# Patient Record
Sex: MALE | Race: WHITE | NOT HISPANIC OR LATINO | Employment: OTHER | URBAN - METROPOLITAN AREA
[De-identification: names, ages, dates, MRNs, and addresses within clinical notes are randomized per-mention and may not be internally consistent; named-entity substitution may affect disease eponyms.]

---

## 2017-03-08 PROCEDURE — 36415 COLL VENOUS BLD VENIPUNCTURE: CPT | Performed by: INTERNAL MEDICINE

## 2017-03-08 PROCEDURE — 82570 ASSAY OF URINE CREATININE: CPT | Performed by: INTERNAL MEDICINE

## 2017-03-08 PROCEDURE — 80061 LIPID PANEL: CPT | Performed by: INTERNAL MEDICINE

## 2017-03-08 PROCEDURE — 80050 GENERAL HEALTH PANEL: CPT | Performed by: INTERNAL MEDICINE

## 2017-03-08 PROCEDURE — 81001 URINALYSIS AUTO W/SCOPE: CPT | Performed by: INTERNAL MEDICINE

## 2017-03-08 PROCEDURE — 82043 UR ALBUMIN QUANTITATIVE: CPT | Performed by: INTERNAL MEDICINE

## 2018-08-15 PROBLEM — E66.01 MORBID OBESITY WITH BMI OF 40.0-44.9, ADULT (HCC): Status: ACTIVE | Noted: 2018-08-15

## 2019-06-04 PROBLEM — R42 VERTIGO: Status: ACTIVE | Noted: 2019-06-04

## 2020-01-10 PROBLEM — R97.20 ELEVATED PSA: Status: ACTIVE | Noted: 2020-01-10

## 2020-04-20 PROCEDURE — 88305 TISSUE EXAM BY PATHOLOGIST: CPT | Performed by: UROLOGY

## 2020-09-10 PROBLEM — C61 MALIGNANT NEOPLASM OF PROSTATE (HCC): Status: ACTIVE | Noted: 2020-09-10

## 2020-10-16 ENCOUNTER — APPOINTMENT (OUTPATIENT)
Dept: CT IMAGING | Age: 65
End: 2020-10-16
Attending: EMERGENCY MEDICINE

## 2020-10-16 ENCOUNTER — HOSPITAL ENCOUNTER (OUTPATIENT)
Age: 65
Setting detail: OBSERVATION
Discharge: HOME | End: 2020-10-18
Attending: EMERGENCY MEDICINE | Admitting: HOSPITALIST

## 2020-10-16 DIAGNOSIS — R33.8 ACUTE URINARY RETENTION: Primary | ICD-10-CM

## 2020-10-16 DIAGNOSIS — N39.0 URINARY TRACT INFECTION WITHOUT HEMATURIA, SITE UNSPECIFIED: ICD-10-CM

## 2020-10-16 LAB
ANION GAP SERPL CALC-SCNC: 10 MMOL/L (ref 10–20)
APPEARANCE UR: ABNORMAL
BACTERIA #/AREA URNS HPF: ABNORMAL /HPF
BASOPHILS # BLD: 0.1 K/MCL (ref 0–0.3)
BASOPHILS NFR BLD: 1 %
BILIRUB UR QL STRIP: NEGATIVE
BUN SERPL-MCNC: 15 MG/DL (ref 6–20)
BUN/CREAT SERPL: 29 (ref 7–25)
CALCIUM SERPL-MCNC: 8.7 MG/DL (ref 8.4–10.2)
CHLORIDE SERPL-SCNC: 102 MMOL/L (ref 98–107)
CO2 SERPL-SCNC: 27 MMOL/L (ref 21–32)
COLOR UR: YELLOW
CREAT SERPL-MCNC: 0.53 MG/DL (ref 0.67–1.17)
DIFFERENTIAL METHOD BLD: ABNORMAL
EOSINOPHIL # BLD: 0.3 K/MCL (ref 0.1–0.5)
EOSINOPHIL NFR BLD: 4 %
ERYTHROCYTE [DISTWIDTH] IN BLOOD: 12.6 % (ref 11–15)
GLUCOSE BLDC GLUCOMTR-MCNC: 159 MG/DL (ref 70–99)
GLUCOSE BLDC GLUCOMTR-MCNC: 160 MG/DL (ref 70–99)
GLUCOSE SERPL-MCNC: 175 MG/DL (ref 65–99)
GLUCOSE UR STRIP-MCNC: NEGATIVE MG/DL
HCT VFR BLD CALC: 36.3 % (ref 39–51)
HGB BLD-MCNC: 11.8 G/DL (ref 13–17)
HGB UR QL STRIP: ABNORMAL
HYALINE CASTS #/AREA URNS LPF: ABNORMAL /LPF (ref 0–5)
IMM GRANULOCYTES # BLD AUTO: 0 K/MCL (ref 0–0.2)
IMM GRANULOCYTES NFR BLD: 0 %
KETONES UR STRIP-MCNC: NEGATIVE MG/DL
LEUKOCYTE ESTERASE UR QL STRIP: ABNORMAL
LYMPHOCYTES # BLD: 0.5 K/MCL (ref 1–4)
LYMPHOCYTES NFR BLD: 5 %
MCH RBC QN AUTO: 30.4 PG (ref 26–34)
MCHC RBC AUTO-ENTMCNC: 32.5 G/DL (ref 32–36.5)
MCV RBC AUTO: 93.6 FL (ref 78–100)
MONOCYTES # BLD: 0.6 K/MCL (ref 0.3–0.9)
MONOCYTES NFR BLD: 7 %
NEUTROPHILS # BLD: 7 K/MCL (ref 1.8–7.7)
NEUTROPHILS NFR BLD: 83 %
NITRITE UR QL STRIP: NEGATIVE
NRBC BLD MANUAL-RTO: 0 /100 WBC
PH UR STRIP: 6 UNITS (ref 5–7)
PLATELET # BLD: 330 K/MCL (ref 140–450)
POTASSIUM SERPL-SCNC: 3.9 MMOL/L (ref 3.4–5.1)
PROT UR STRIP-MCNC: 30 MG/DL
RBC # BLD: 3.88 MIL/MCL (ref 4.5–5.9)
RBC #/AREA URNS HPF: ABNORMAL /HPF (ref 0–2)
SODIUM SERPL-SCNC: 135 MMOL/L (ref 135–145)
SP GR UR STRIP: 1.01 (ref 1–1.03)
SPECIMEN SOURCE: ABNORMAL
SQUAMOUS #/AREA URNS HPF: ABNORMAL /HPF (ref 0–5)
UROBILINOGEN UR STRIP-MCNC: 0.2 MG/DL (ref 0–1)
WBC # BLD: 8.4 K/MCL (ref 4.2–11)
WBC #/AREA URNS HPF: >100 /HPF (ref 0–5)

## 2020-10-16 PROCEDURE — 96375 TX/PRO/DX INJ NEW DRUG ADDON: CPT

## 2020-10-16 PROCEDURE — 10002800 HB RX 250 W HCPCS: Performed by: EMERGENCY MEDICINE

## 2020-10-16 PROCEDURE — 10002807 HB RX 258: Performed by: EMERGENCY MEDICINE

## 2020-10-16 PROCEDURE — 51798 US URINE CAPACITY MEASURE: CPT

## 2020-10-16 PROCEDURE — 87086 URINE CULTURE/COLONY COUNT: CPT

## 2020-10-16 PROCEDURE — 81001 URINALYSIS AUTO W/SCOPE: CPT

## 2020-10-16 PROCEDURE — 96374 THER/PROPH/DIAG INJ IV PUSH: CPT

## 2020-10-16 PROCEDURE — 10002800 HB RX 250 W HCPCS: Performed by: HOSPITALIST

## 2020-10-16 PROCEDURE — 99284 EMERGENCY DEPT VISIT MOD MDM: CPT

## 2020-10-16 PROCEDURE — 96376 TX/PRO/DX INJ SAME DRUG ADON: CPT

## 2020-10-16 PROCEDURE — G0378 HOSPITAL OBSERVATION PER HR: HCPCS

## 2020-10-16 PROCEDURE — 10004651 HB RX, NO CHARGE ITEM: Performed by: HOSPITALIST

## 2020-10-16 PROCEDURE — 80048 BASIC METABOLIC PNL TOTAL CA: CPT

## 2020-10-16 PROCEDURE — 10002807 HB RX 258: Performed by: HOSPITALIST

## 2020-10-16 PROCEDURE — 10002803 HB RX 637: Performed by: HOSPITALIST

## 2020-10-16 PROCEDURE — 74177 CT ABD & PELVIS W/CONTRAST: CPT

## 2020-10-16 PROCEDURE — 85025 COMPLETE CBC W/AUTO DIFF WBC: CPT

## 2020-10-16 PROCEDURE — 82962 GLUCOSE BLOOD TEST: CPT

## 2020-10-16 PROCEDURE — 87040 BLOOD CULTURE FOR BACTERIA: CPT

## 2020-10-16 PROCEDURE — 10002805 HB CONTRAST AGENT: Performed by: EMERGENCY MEDICINE

## 2020-10-16 RX ORDER — ATORVASTATIN CALCIUM 40 MG/1
40 TABLET, FILM COATED ORAL NIGHTLY
Status: DISCONTINUED | OUTPATIENT
Start: 2020-10-16 | End: 2020-10-18 | Stop reason: HOSPADM

## 2020-10-16 RX ORDER — TRIAMTERENE AND HYDROCHLOROTHIAZIDE 37.5; 25 MG/1; MG/1
1 TABLET ORAL DAILY
COMMUNITY
End: 2021-04-26

## 2020-10-16 RX ORDER — SIMVASTATIN 80 MG
80 TABLET ORAL DAILY
COMMUNITY

## 2020-10-16 RX ORDER — NICOTINE POLACRILEX 4 MG
30 LOZENGE BUCCAL PRN
Status: DISCONTINUED | OUTPATIENT
Start: 2020-10-16 | End: 2020-10-18 | Stop reason: HOSPADM

## 2020-10-16 RX ORDER — ENALAPRIL MALEATE 2.5 MG/1
2.5 TABLET ORAL DAILY
Status: DISCONTINUED | OUTPATIENT
Start: 2020-10-17 | End: 2020-10-18 | Stop reason: HOSPADM

## 2020-10-16 RX ORDER — TRIAMTERENE AND HYDROCHLOROTHIAZIDE 37.5; 25 MG/1; MG/1
1 TABLET ORAL DAILY
Status: DISCONTINUED | OUTPATIENT
Start: 2020-10-17 | End: 2020-10-18 | Stop reason: HOSPADM

## 2020-10-16 RX ORDER — HYDROCHLOROTHIAZIDE 12.5 MG/1
12.5 CAPSULE, GELATIN COATED ORAL DAILY
Status: ON HOLD | COMMUNITY
End: 2020-10-16 | Stop reason: ALTCHOICE

## 2020-10-16 RX ORDER — TAMSULOSIN HYDROCHLORIDE 0.4 MG/1
0.4 CAPSULE ORAL
Status: ON HOLD | COMMUNITY
End: 2020-10-18 | Stop reason: HOSPADM

## 2020-10-16 RX ORDER — NICOTINE POLACRILEX 4 MG
15 LOZENGE BUCCAL PRN
Status: DISCONTINUED | OUTPATIENT
Start: 2020-10-16 | End: 2020-10-18 | Stop reason: HOSPADM

## 2020-10-16 RX ORDER — 0.9 % SODIUM CHLORIDE 0.9 %
2 VIAL (ML) INJECTION EVERY 12 HOURS SCHEDULED
Status: DISCONTINUED | OUTPATIENT
Start: 2020-10-16 | End: 2020-10-18 | Stop reason: HOSPADM

## 2020-10-16 RX ORDER — VERAPAMIL HYDROCHLORIDE 240 MG/1
240 CAPSULE, EXTENDED RELEASE ORAL DAILY
COMMUNITY
End: 2021-04-26

## 2020-10-16 RX ORDER — ENOXAPARIN SODIUM 100 MG/ML
40 INJECTION SUBCUTANEOUS DAILY
Status: DISCONTINUED | OUTPATIENT
Start: 2020-10-16 | End: 2020-10-16

## 2020-10-16 RX ORDER — TAMSULOSIN HYDROCHLORIDE 0.4 MG/1
0.4 CAPSULE ORAL
Status: DISCONTINUED | OUTPATIENT
Start: 2020-10-17 | End: 2020-10-18 | Stop reason: HOSPADM

## 2020-10-16 RX ORDER — CIPROFLOXACIN 250 MG/1
250 TABLET, FILM COATED ORAL 2 TIMES DAILY
COMMUNITY
Start: 2020-10-12 | End: 2020-10-18

## 2020-10-16 RX ORDER — DEXTROSE MONOHYDRATE 25 G/50ML
25 INJECTION, SOLUTION INTRAVENOUS PRN
Status: DISCONTINUED | OUTPATIENT
Start: 2020-10-16 | End: 2020-10-18 | Stop reason: HOSPADM

## 2020-10-16 RX ORDER — LEVOTHYROXINE SODIUM 0.2 MG/1
200 TABLET ORAL DAILY
COMMUNITY

## 2020-10-16 RX ORDER — DIPHENHYDRAMINE HYDROCHLORIDE 50 MG/ML
25 INJECTION INTRAMUSCULAR; INTRAVENOUS ONCE
Status: COMPLETED | OUTPATIENT
Start: 2020-10-16 | End: 2020-10-16

## 2020-10-16 RX ORDER — DEXTROSE MONOHYDRATE 25 G/50ML
12.5 INJECTION, SOLUTION INTRAVENOUS PRN
Status: DISCONTINUED | OUTPATIENT
Start: 2020-10-16 | End: 2020-10-18 | Stop reason: HOSPADM

## 2020-10-16 RX ORDER — VERAPAMIL HYDROCHLORIDE 240 MG/1
240 TABLET, FILM COATED, EXTENDED RELEASE ORAL DAILY
Status: DISCONTINUED | OUTPATIENT
Start: 2020-10-17 | End: 2020-10-18 | Stop reason: HOSPADM

## 2020-10-16 RX ORDER — LEVOTHYROXINE SODIUM 0.1 MG/1
200 TABLET ORAL
Status: DISCONTINUED | OUTPATIENT
Start: 2020-10-17 | End: 2020-10-18 | Stop reason: HOSPADM

## 2020-10-16 RX ORDER — DEXTROSE MONOHYDRATE 50 MG/ML
INJECTION, SOLUTION INTRAVENOUS CONTINUOUS PRN
Status: DISCONTINUED | OUTPATIENT
Start: 2020-10-16 | End: 2020-10-18 | Stop reason: HOSPADM

## 2020-10-16 RX ORDER — FLUTICASONE PROPIONATE AND SALMETEROL 250; 50 UG/1; UG/1
1 POWDER RESPIRATORY (INHALATION)
COMMUNITY

## 2020-10-16 RX ORDER — ENALAPRIL MALEATE 2.5 MG/1
2.5 TABLET ORAL DAILY
COMMUNITY

## 2020-10-16 RX ORDER — ENOXAPARIN SODIUM 100 MG/ML
40 INJECTION SUBCUTANEOUS EVERY 12 HOURS
Status: DISCONTINUED | OUTPATIENT
Start: 2020-10-17 | End: 2020-10-18 | Stop reason: HOSPADM

## 2020-10-16 RX ORDER — FLUTICASONE FUROATE AND VILANTEROL 100; 25 UG/1; UG/1
1 POWDER RESPIRATORY (INHALATION)
Status: DISCONTINUED | OUTPATIENT
Start: 2020-10-17 | End: 2020-10-18 | Stop reason: HOSPADM

## 2020-10-16 RX ORDER — LIDOCAINE HYDROCHLORIDE 20 MG/ML
10 JELLY TOPICAL
Status: DISCONTINUED | OUTPATIENT
Start: 2020-10-16 | End: 2020-10-18 | Stop reason: HOSPADM

## 2020-10-16 RX ADMIN — DIPHENHYDRAMINE HYDROCHLORIDE 25 MG: 50 INJECTION, SOLUTION INTRAMUSCULAR; INTRAVENOUS at 12:51

## 2020-10-16 RX ADMIN — SODIUM CHLORIDE, PRESERVATIVE FREE 2 ML: 5 INJECTION INTRAVENOUS at 20:23

## 2020-10-16 RX ADMIN — IOHEXOL 100 ML: 350 INJECTION, SOLUTION INTRAVENOUS at 14:15

## 2020-10-16 RX ADMIN — HYDROCORTISONE SODIUM SUCCINATE 50 MG: 100 INJECTION, POWDER, FOR SOLUTION INTRAMUSCULAR; INTRAVENOUS at 12:51

## 2020-10-16 RX ADMIN — SODIUM CHLORIDE 25 ML: 0.9 INJECTION, SOLUTION INTRAVENOUS at 22:41

## 2020-10-16 RX ADMIN — DESMOPRESSIN ACETATE 40 MG: 0.2 TABLET ORAL at 22:38

## 2020-10-16 RX ADMIN — CEFEPIME HYDROCHLORIDE 1000 MG: 1 INJECTION, POWDER, FOR SOLUTION INTRAMUSCULAR; INTRAVENOUS at 22:44

## 2020-10-16 RX ADMIN — CEFEPIME 2000 MG: 2 INJECTION, POWDER, FOR SOLUTION INTRAVENOUS at 15:45

## 2020-10-16 RX ADMIN — INSULIN LISPRO 1 UNITS: 100 INJECTION, SOLUTION INTRAVENOUS; SUBCUTANEOUS at 18:54

## 2020-10-16 ASSESSMENT — PAIN SCALES - GENERAL
PAINLEVEL_OUTOF10: 3
PAINLEVEL_OUTOF10: 3
PAINLEVEL_OUTOF10: 4

## 2020-10-16 ASSESSMENT — COGNITIVE AND FUNCTIONAL STATUS - GENERAL
DO YOU HAVE SERIOUS DIFFICULTY WALKING OR CLIMBING STAIRS: NO
ARE YOU BLIND OR DO YOU HAVE SERIOUS DIFFICULTY SEEING, EVEN WHEN WEARING GLASSES: NO
BECAUSE OF A PHYSICAL, MENTAL, OR EMOTIONAL CONDITION, DO YOU HAVE DIFFICULTY DOING ERRANDS ALONE: NO
DO YOU HAVE DIFFICULTY DRESSING OR BATHING: NO
BECAUSE OF A PHYSICAL, MENTAL, OR EMOTIONAL CONDITION, DO YOU HAVE SERIOUS DIFFICULTY CONCENTRATING, REMEMBERING OR MAKING DECISIONS: NO
ARE YOU DEAF OR DO YOU HAVE SERIOUS DIFFICULTY  HEARING: NO

## 2020-10-16 ASSESSMENT — LIFESTYLE VARIABLES
HOW MANY STANDARD DRINKS CONTAINING ALCOHOL DO YOU HAVE ON A TYPICAL DAY: 0,1 OR 2
HOW OFTEN DO YOU HAVE 6 OR MORE DRINKS ON ONE OCCASION: LESS THAN MONTHLY
HOW OFTEN DO YOU HAVE A DRINK CONTAINING ALCOHOL: MONTHLY OR LESS
AUDIT-C TOTAL SCORE: 2
ALCOHOL_USE_STATUS: NO OR LOW RISK WITH VALIDATED TOOL

## 2020-10-16 ASSESSMENT — COLUMBIA-SUICIDE SEVERITY RATING SCALE - C-SSRS
2. HAVE YOU ACTUALLY HAD ANY THOUGHTS OF KILLING YOURSELF?: NO
IS THE PATIENT ABLE TO COMPLETE C-SSRS: YES
6. HAVE YOU EVER DONE ANYTHING, STARTED TO DO ANYTHING, OR PREPARED TO DO ANYTHING TO END YOUR LIFE?: NO

## 2020-10-16 ASSESSMENT — ACTIVITIES OF DAILY LIVING (ADL)
RECENT_DECLINE_ADL: NO
ADL_SHORT_OF_BREATH: NO
DESCRIBE HOW PAIN IMPACTS YOUR LIFE: NONE/CAN MANAGE
ADL_SCORE: 12
ADL_BEFORE_ADMISSION: INDEPENDENT
CHRONIC_PAIN_PRESENT: YES, CANCER

## 2020-10-16 ASSESSMENT — PATIENT HEALTH QUESTIONNAIRE - PHQ9
CLINICAL INTERPRETATION OF PHQ2 SCORE: NO FURTHER SCREENING NEEDED
CLINICAL INTERPRETATION OF PHQ9 SCORE: NO FURTHER SCREENING NEEDED
SUM OF ALL RESPONSES TO PHQ9 QUESTIONS 1 AND 2: 0
IS PATIENT ABLE TO COMPLETE PHQ2 OR PHQ9: YES
2. FEELING DOWN, DEPRESSED OR HOPELESS: NOT AT ALL
SUM OF ALL RESPONSES TO PHQ9 QUESTIONS 1 AND 2: 0
1. LITTLE INTEREST OR PLEASURE IN DOING THINGS: NOT AT ALL

## 2020-10-17 LAB
ANION GAP SERPL CALC-SCNC: 9 MMOL/L (ref 10–20)
BACTERIA UR CULT: NORMAL
BASOPHILS # BLD: 0 K/MCL (ref 0–0.3)
BASOPHILS NFR BLD: 1 %
BUN SERPL-MCNC: 10 MG/DL (ref 6–20)
BUN/CREAT SERPL: 19 (ref 7–25)
CALCIUM SERPL-MCNC: 8.8 MG/DL (ref 8.4–10.2)
CHLORIDE SERPL-SCNC: 103 MMOL/L (ref 98–107)
CO2 SERPL-SCNC: 29 MMOL/L (ref 21–32)
CREAT SERPL-MCNC: 0.52 MG/DL (ref 0.67–1.17)
DIFFERENTIAL METHOD BLD: ABNORMAL
EOSINOPHIL # BLD: 0.3 K/MCL (ref 0.1–0.5)
EOSINOPHIL NFR BLD: 5 %
ERYTHROCYTE [DISTWIDTH] IN BLOOD: 12.6 % (ref 11–15)
GLUCOSE BLDC GLUCOMTR-MCNC: 115 MG/DL (ref 70–99)
GLUCOSE BLDC GLUCOMTR-MCNC: 151 MG/DL (ref 70–99)
GLUCOSE BLDC GLUCOMTR-MCNC: 156 MG/DL (ref 70–99)
GLUCOSE BLDC GLUCOMTR-MCNC: 162 MG/DL (ref 70–99)
GLUCOSE SERPL-MCNC: 162 MG/DL (ref 65–99)
HCT VFR BLD CALC: 34.2 % (ref 39–51)
HGB BLD-MCNC: 10.8 G/DL (ref 13–17)
IMM GRANULOCYTES # BLD AUTO: 0 K/MCL (ref 0–0.2)
IMM GRANULOCYTES NFR BLD: 1 %
LYMPHOCYTES # BLD: 0.5 K/MCL (ref 1–4)
LYMPHOCYTES NFR BLD: 9 %
MCH RBC QN AUTO: 30 PG (ref 26–34)
MCHC RBC AUTO-ENTMCNC: 31.6 G/DL (ref 32–36.5)
MCV RBC AUTO: 95 FL (ref 78–100)
MONOCYTES # BLD: 0.5 K/MCL (ref 0.3–0.9)
MONOCYTES NFR BLD: 9 %
NEUTROPHILS # BLD: 4.3 K/MCL (ref 1.8–7.7)
NEUTROPHILS NFR BLD: 75 %
NRBC BLD MANUAL-RTO: 0 /100 WBC
PLATELET # BLD: 290 K/MCL (ref 140–450)
POTASSIUM SERPL-SCNC: 3.6 MMOL/L (ref 3.4–5.1)
RBC # BLD: 3.6 MIL/MCL (ref 4.5–5.9)
REPORT STATUS (RPT): NORMAL
SODIUM SERPL-SCNC: 137 MMOL/L (ref 135–145)
SPECIMEN SOURCE: NORMAL
WBC # BLD: 5.6 K/MCL (ref 4.2–11)

## 2020-10-17 PROCEDURE — 10004651 HB RX, NO CHARGE ITEM: Performed by: HOSPITALIST

## 2020-10-17 PROCEDURE — 96376 TX/PRO/DX INJ SAME DRUG ADON: CPT

## 2020-10-17 PROCEDURE — G0378 HOSPITAL OBSERVATION PER HR: HCPCS

## 2020-10-17 PROCEDURE — 82962 GLUCOSE BLOOD TEST: CPT

## 2020-10-17 PROCEDURE — 36415 COLL VENOUS BLD VENIPUNCTURE: CPT

## 2020-10-17 PROCEDURE — 10002803 HB RX 637: Performed by: HOSPITALIST

## 2020-10-17 PROCEDURE — 85025 COMPLETE CBC W/AUTO DIFF WBC: CPT

## 2020-10-17 PROCEDURE — 80048 BASIC METABOLIC PNL TOTAL CA: CPT

## 2020-10-17 PROCEDURE — 94640 AIRWAY INHALATION TREATMENT: CPT

## 2020-10-17 PROCEDURE — 10002807 HB RX 258: Performed by: HOSPITALIST

## 2020-10-17 PROCEDURE — 10004281 HB COUNTER-STAFF TIME PER 15 MIN

## 2020-10-17 PROCEDURE — 10002800 HB RX 250 W HCPCS: Performed by: HOSPITALIST

## 2020-10-17 RX ADMIN — DESMOPRESSIN ACETATE 40 MG: 0.2 TABLET ORAL at 21:16

## 2020-10-17 RX ADMIN — SODIUM CHLORIDE, PRESERVATIVE FREE 2 ML: 5 INJECTION INTRAVENOUS at 21:18

## 2020-10-17 RX ADMIN — TAMSULOSIN HYDROCHLORIDE 0.4 MG: 0.4 CAPSULE ORAL at 08:57

## 2020-10-17 RX ADMIN — SODIUM CHLORIDE, PRESERVATIVE FREE 2 ML: 5 INJECTION INTRAVENOUS at 08:59

## 2020-10-17 RX ADMIN — CEFEPIME HYDROCHLORIDE 1000 MG: 1 INJECTION, POWDER, FOR SOLUTION INTRAMUSCULAR; INTRAVENOUS at 14:29

## 2020-10-17 RX ADMIN — FLUTICASONE FUROATE AND VILANTEROL TRIFENATATE 1 PUFF: 100; 25 POWDER RESPIRATORY (INHALATION) at 09:19

## 2020-10-17 RX ADMIN — CEFEPIME HYDROCHLORIDE 1000 MG: 1 INJECTION, POWDER, FOR SOLUTION INTRAMUSCULAR; INTRAVENOUS at 21:20

## 2020-10-17 RX ADMIN — VERAPAMIL HYDROCHLORIDE 240 MG: 240 TABLET, FILM COATED, EXTENDED RELEASE ORAL at 08:57

## 2020-10-17 RX ADMIN — ENALAPRIL MALEATE 2.5 MG: 2.5 TABLET ORAL at 08:57

## 2020-10-17 RX ADMIN — INSULIN LISPRO 3 UNITS: 100 INJECTION, SOLUTION INTRAVENOUS; SUBCUTANEOUS at 17:41

## 2020-10-17 RX ADMIN — INSULIN LISPRO 3 UNITS: 100 INJECTION, SOLUTION INTRAVENOUS; SUBCUTANEOUS at 08:55

## 2020-10-17 RX ADMIN — INSULIN LISPRO 2 UNITS: 100 INJECTION, SOLUTION INTRAVENOUS; SUBCUTANEOUS at 13:14

## 2020-10-17 RX ADMIN — CEFEPIME HYDROCHLORIDE 1000 MG: 1 INJECTION, POWDER, FOR SOLUTION INTRAMUSCULAR; INTRAVENOUS at 05:48

## 2020-10-17 RX ADMIN — LEVOTHYROXINE SODIUM 200 MCG: 100 TABLET ORAL at 05:45

## 2020-10-17 RX ADMIN — TRIAMTERENE AND HYDROCHLOROTHIAZIDE 1 TABLET: 37.5; 25 TABLET ORAL at 08:57

## 2020-10-17 ASSESSMENT — PAIN SCALES - GENERAL
PAINLEVEL_OUTOF10: 0
PAINLEVEL_OUTOF10: 0

## 2020-10-18 VITALS
TEMPERATURE: 98.1 F | RESPIRATION RATE: 16 BRPM | WEIGHT: 277.78 LBS | DIASTOLIC BLOOD PRESSURE: 74 MMHG | HEART RATE: 78 BPM | HEIGHT: 68 IN | SYSTOLIC BLOOD PRESSURE: 156 MMHG | OXYGEN SATURATION: 95 % | BODY MASS INDEX: 42.1 KG/M2

## 2020-10-18 LAB
GLUCOSE BLDC GLUCOMTR-MCNC: 162 MG/DL (ref 70–99)
POTASSIUM SERPL-SCNC: 4 MMOL/L (ref 3.4–5.1)

## 2020-10-18 PROCEDURE — 10004281 HB COUNTER-STAFF TIME PER 15 MIN

## 2020-10-18 PROCEDURE — 10002807 HB RX 258: Performed by: HOSPITALIST

## 2020-10-18 PROCEDURE — 10002803 HB RX 637: Performed by: HOSPITALIST

## 2020-10-18 PROCEDURE — 94640 AIRWAY INHALATION TREATMENT: CPT

## 2020-10-18 PROCEDURE — 36415 COLL VENOUS BLD VENIPUNCTURE: CPT

## 2020-10-18 PROCEDURE — 10002800 HB RX 250 W HCPCS: Performed by: HOSPITALIST

## 2020-10-18 PROCEDURE — 82962 GLUCOSE BLOOD TEST: CPT

## 2020-10-18 PROCEDURE — 96376 TX/PRO/DX INJ SAME DRUG ADON: CPT

## 2020-10-18 PROCEDURE — 10004651 HB RX, NO CHARGE ITEM: Performed by: HOSPITALIST

## 2020-10-18 PROCEDURE — 84132 ASSAY OF SERUM POTASSIUM: CPT

## 2020-10-18 PROCEDURE — G0378 HOSPITAL OBSERVATION PER HR: HCPCS

## 2020-10-18 RX ORDER — TAMSULOSIN HYDROCHLORIDE 0.4 MG/1
0.8 CAPSULE ORAL
Qty: 30 CAPSULE | Refills: 0 | Status: SHIPPED | OUTPATIENT
Start: 2020-10-19

## 2020-10-18 RX ADMIN — ENALAPRIL MALEATE 2.5 MG: 2.5 TABLET ORAL at 08:18

## 2020-10-18 RX ADMIN — FLUTICASONE FUROATE AND VILANTEROL TRIFENATATE 1 PUFF: 100; 25 POWDER RESPIRATORY (INHALATION) at 09:04

## 2020-10-18 RX ADMIN — SODIUM CHLORIDE, PRESERVATIVE FREE 2 ML: 5 INJECTION INTRAVENOUS at 08:24

## 2020-10-18 RX ADMIN — VERAPAMIL HYDROCHLORIDE 240 MG: 240 TABLET, FILM COATED, EXTENDED RELEASE ORAL at 08:19

## 2020-10-18 RX ADMIN — CEFEPIME HYDROCHLORIDE 1000 MG: 1 INJECTION, POWDER, FOR SOLUTION INTRAMUSCULAR; INTRAVENOUS at 06:42

## 2020-10-18 RX ADMIN — TRIAMTERENE AND HYDROCHLOROTHIAZIDE 1 TABLET: 37.5; 25 TABLET ORAL at 08:18

## 2020-10-18 RX ADMIN — TAMSULOSIN HYDROCHLORIDE 0.4 MG: 0.4 CAPSULE ORAL at 08:19

## 2020-10-18 RX ADMIN — INSULIN LISPRO 1 UNITS: 100 INJECTION, SOLUTION INTRAVENOUS; SUBCUTANEOUS at 08:18

## 2020-10-18 RX ADMIN — CEFEPIME HYDROCHLORIDE 1000 MG: 1 INJECTION, POWDER, FOR SOLUTION INTRAMUSCULAR; INTRAVENOUS at 14:09

## 2020-10-18 RX ADMIN — LEVOTHYROXINE SODIUM 200 MCG: 100 TABLET ORAL at 06:46

## 2020-10-21 LAB
BACTERIA BLD CULT: NORMAL
BACTERIA BLD CULT: NORMAL
REPORT STATUS (RPT): NORMAL
REPORT STATUS (RPT): NORMAL
SPECIMEN SOURCE: NORMAL
SPECIMEN SOURCE: NORMAL

## 2021-02-11 PROBLEM — R42 VERTIGO: Status: RESOLVED | Noted: 2019-06-04 | Resolved: 2021-02-11

## 2021-03-03 ENCOUNTER — HOSPITAL ENCOUNTER (EMERGENCY)
Facility: HOSPITAL | Age: 66
Discharge: HOME | End: 2021-03-03
Attending: EMERGENCY MEDICINE
Payer: MEDICARE

## 2021-03-03 VITALS
HEIGHT: 68 IN | SYSTOLIC BLOOD PRESSURE: 118 MMHG | TEMPERATURE: 98 F | DIASTOLIC BLOOD PRESSURE: 53 MMHG | HEART RATE: 103 BPM | WEIGHT: 265 LBS | OXYGEN SATURATION: 96 % | BODY MASS INDEX: 40.16 KG/M2 | RESPIRATION RATE: 20 BRPM

## 2021-03-03 DIAGNOSIS — I48.92 ATRIAL FIBRILLATION AND FLUTTER (HCC): ICD-10-CM

## 2021-03-03 DIAGNOSIS — I48.91 ATRIAL FIBRILLATION WITH RAPID VENTRICULAR RESPONSE (HCC): ICD-10-CM

## 2021-03-03 DIAGNOSIS — I48.92 NEW ONSET ATRIAL FLUTTER (HCC): Primary | ICD-10-CM

## 2021-03-03 DIAGNOSIS — I48.91 ATRIAL FIBRILLATION AND FLUTTER (HCC): ICD-10-CM

## 2021-03-03 LAB
ANION GAP SERPL CALC-SCNC: 8 MMOL/L (ref 0–18)
APTT PPP: 26.3 SECONDS (ref 23.2–35.3)
BASOPHILS # BLD AUTO: 0.03 X10(3) UL (ref 0–0.2)
BASOPHILS NFR BLD AUTO: 0.3 %
BUN BLD-MCNC: 28 MG/DL (ref 7–18)
BUN/CREAT SERPL: 28.6 (ref 10–20)
CALCIUM BLD-MCNC: 9.2 MG/DL (ref 8.5–10.1)
CHLORIDE SERPL-SCNC: 102 MMOL/L (ref 98–112)
CO2 SERPL-SCNC: 25 MMOL/L (ref 21–32)
CREAT BLD-MCNC: 0.98 MG/DL
D DIMER PPP FEU-MCNC: 0.27 UG/ML FEU (ref ?–0.67)
DEPRECATED RDW RBC AUTO: 44.8 FL (ref 35.1–46.3)
EOSINOPHIL # BLD AUTO: 0.01 X10(3) UL (ref 0–0.7)
EOSINOPHIL NFR BLD AUTO: 0.1 %
ERYTHROCYTE [DISTWIDTH] IN BLOOD BY AUTOMATED COUNT: 13.8 % (ref 11–15)
GLUCOSE BLD-MCNC: 188 MG/DL (ref 70–99)
HAV IGM SER QL: 1.9 MG/DL (ref 1.6–2.6)
HCT VFR BLD AUTO: 43.8 %
HGB BLD-MCNC: 14.3 G/DL
IMM GRANULOCYTES # BLD AUTO: 0.05 X10(3) UL (ref 0–1)
IMM GRANULOCYTES NFR BLD: 0.4 %
INR BLD: 1.04 (ref 0.9–1.2)
LYMPHOCYTES # BLD AUTO: 0.75 X10(3) UL (ref 1–4)
LYMPHOCYTES NFR BLD AUTO: 6.5 %
MCH RBC QN AUTO: 29.3 PG (ref 26–34)
MCHC RBC AUTO-ENTMCNC: 32.6 G/DL (ref 31–37)
MCV RBC AUTO: 89.8 FL
MONOCYTES # BLD AUTO: 0.64 X10(3) UL (ref 0.1–1)
MONOCYTES NFR BLD AUTO: 5.5 %
NEUTROPHILS # BLD AUTO: 10.12 X10 (3) UL (ref 1.5–7.7)
NEUTROPHILS # BLD AUTO: 10.12 X10(3) UL (ref 1.5–7.7)
NEUTROPHILS NFR BLD AUTO: 87.2 %
OSMOLALITY SERPL CALC.SUM OF ELEC: 290 MOSM/KG (ref 275–295)
PLATELET # BLD AUTO: 308 10(3)UL (ref 150–450)
POTASSIUM SERPL-SCNC: 4.7 MMOL/L (ref 3.5–5.1)
PROTHROMBIN TIME: 13.4 SECONDS (ref 11.8–14.5)
RBC # BLD AUTO: 4.88 X10(6)UL
SODIUM SERPL-SCNC: 135 MMOL/L (ref 136–145)
TROPONIN I SERPL-MCNC: <0.045 NG/ML (ref ?–0.04)
TSI SER-ACNC: 0.38 MIU/ML (ref 0.36–3.74)
WBC # BLD AUTO: 11.6 X10(3) UL (ref 4–11)

## 2021-03-03 PROCEDURE — 96374 THER/PROPH/DIAG INJ IV PUSH: CPT

## 2021-03-03 PROCEDURE — 84484 ASSAY OF TROPONIN QUANT: CPT | Performed by: EMERGENCY MEDICINE

## 2021-03-03 PROCEDURE — 96361 HYDRATE IV INFUSION ADD-ON: CPT

## 2021-03-03 PROCEDURE — 93005 ELECTROCARDIOGRAM TRACING: CPT

## 2021-03-03 PROCEDURE — 84443 ASSAY THYROID STIM HORMONE: CPT | Performed by: EMERGENCY MEDICINE

## 2021-03-03 PROCEDURE — 99285 EMERGENCY DEPT VISIT HI MDM: CPT

## 2021-03-03 PROCEDURE — 93010 ELECTROCARDIOGRAM REPORT: CPT | Performed by: INTERNAL MEDICINE

## 2021-03-03 PROCEDURE — 84484 ASSAY OF TROPONIN QUANT: CPT

## 2021-03-03 PROCEDURE — 85025 COMPLETE CBC W/AUTO DIFF WBC: CPT

## 2021-03-03 PROCEDURE — 85379 FIBRIN DEGRADATION QUANT: CPT | Performed by: EMERGENCY MEDICINE

## 2021-03-03 PROCEDURE — 80048 BASIC METABOLIC PNL TOTAL CA: CPT

## 2021-03-03 PROCEDURE — 85025 COMPLETE CBC W/AUTO DIFF WBC: CPT | Performed by: EMERGENCY MEDICINE

## 2021-03-03 PROCEDURE — 80048 BASIC METABOLIC PNL TOTAL CA: CPT | Performed by: EMERGENCY MEDICINE

## 2021-03-03 PROCEDURE — 85730 THROMBOPLASTIN TIME PARTIAL: CPT | Performed by: EMERGENCY MEDICINE

## 2021-03-03 PROCEDURE — 83735 ASSAY OF MAGNESIUM: CPT | Performed by: EMERGENCY MEDICINE

## 2021-03-03 PROCEDURE — 85610 PROTHROMBIN TIME: CPT | Performed by: EMERGENCY MEDICINE

## 2021-03-03 RX ORDER — HEPARIN SODIUM 1000 [USP'U]/ML
5000 INJECTION, SOLUTION INTRAVENOUS; SUBCUTANEOUS ONCE
Status: DISCONTINUED | OUTPATIENT
Start: 2021-03-03 | End: 2021-03-03

## 2021-03-03 RX ORDER — DILTIAZEM HYDROCHLORIDE 120 MG/1
120 CAPSULE, EXTENDED RELEASE ORAL ONCE
Status: DISCONTINUED | OUTPATIENT
Start: 2021-03-03 | End: 2021-03-03

## 2021-03-03 RX ORDER — DILTIAZEM HYDROCHLORIDE 240 MG/1
240 CAPSULE, COATED, EXTENDED RELEASE ORAL DAILY
Qty: 30 CAPSULE | Refills: 0 | Status: SHIPPED | OUTPATIENT
Start: 2021-03-03 | End: 2021-03-12

## 2021-03-03 RX ORDER — DILTIAZEM HYDROCHLORIDE 5 MG/ML
20 INJECTION INTRAVENOUS ONCE
Status: COMPLETED | OUTPATIENT
Start: 2021-03-03 | End: 2021-03-03

## 2021-03-03 RX ORDER — DILTIAZEM HYDROCHLORIDE 5 MG/ML
10 INJECTION INTRAVENOUS ONCE
Status: COMPLETED | OUTPATIENT
Start: 2021-03-03 | End: 2021-03-03

## 2021-03-03 RX ORDER — DILTIAZEM HYDROCHLORIDE 120 MG/1
120 CAPSULE, EXTENDED RELEASE ORAL ONCE
Status: COMPLETED | OUTPATIENT
Start: 2021-03-03 | End: 2021-03-03

## 2021-03-03 NOTE — ED PROVIDER NOTES
Patient Seen in: [facility] AND [facility] Emergency Department    History   Patient presents with:  Abnormal Result    Stated Complaint: A-fib     HPI    78 yo M with PMH asthma, HTN, HL, hypothyroid, DM, prostate cancer in remission status post brachytherapy pre 04/20/2020    PNBx- Dr. Gustafson Grant    • REMOVAL GALLBLADDER         Medications :   predniSONE 20 MG Oral Tab,  Take one tablet TID for 3 days, then one tab BID x 3 days, then one tab qd x 3 days, then 1/2 tab qd x 3d   Levothyroxine Sodium 200 MCG Oral Tab,  T negative except as noted above. PSFH elements reviewed from today and agreed except as otherwise stated in HPI.     Physical Exam     ED Triage Vitals [03/03/21 1551]   /90   Pulse (!) 151   Resp 20   Temp 98 °F (36.7 °C)   Temp src Oral   SpO2 95 for these tests on the individual orders. RAINBOW DRAW BLUE   RAINBOW DRAW LAVENDER   RAINBOW DRAW LIGHT GREEN   RAINBOW DRAW GOLD     EKG    Rate, intervals and axes as noted on EKG Report.   Rate: 159  Rhythm: Atrial Fibrillation  Reading: afib 150s wit with cardiology and agree with rate control initiation in addition to anticoagulation given CHADS score; eliquis/oral diltiazem initiated, Dr. Carmen Montero facilitating next day followup and in agreement with plan of care; patient understanding of and ([facility]  (primary encounter diagnosis)  Atrial fibrillation and flutter ([facility]  Atrial fibrillation with rapid ventricular response ([facility]    Disposition:  Discharge    Follow-up:  Yeimy Tubbs MD  [address] [place] [id number]  [phone]

## 2021-03-03 NOTE — ED INITIAL ASSESSMENT (HPI)
Patient presents to ER from MD, for abnormal EKG showing A-fib. Patient has no known history of A-fib. EKG shows a-fib in the 150s. Denies any complaints. No dizziness, light headedness or palpitations.

## 2021-03-12 PROBLEM — I48.3 TYPICAL ATRIAL FLUTTER (HCC): Status: ACTIVE | Noted: 2021-03-12

## 2021-04-16 DIAGNOSIS — Z01.812 PRE-PROCEDURE LAB EXAM: Primary | ICD-10-CM

## 2021-04-25 ENCOUNTER — LAB SERVICES (OUTPATIENT)
Dept: LAB | Age: 66
End: 2021-04-25

## 2021-04-25 DIAGNOSIS — Z01.812 PRE-PROCEDURE LAB EXAM: ICD-10-CM

## 2021-04-25 LAB
SARS-COV-2 RNA RESP QL NAA+PROBE: NOT DETECTED
SERVICE CMNT-IMP: NORMAL
SERVICE CMNT-IMP: NORMAL

## 2021-04-25 PROCEDURE — U0003 INFECTIOUS AGENT DETECTION BY NUCLEIC ACID (DNA OR RNA); SEVERE ACUTE RESPIRATORY SYNDROME CORONAVIRUS 2 (SARS-COV-2) (CORONAVIRUS DISEASE [COVID-19]), AMPLIFIED PROBE TECHNIQUE, MAKING USE OF HIGH THROUGHPUT TECHNOLOGIES AS DESCRIBED BY CMS-2020-01-R: HCPCS | Performed by: INTERNAL MEDICINE

## 2021-04-25 PROCEDURE — U0005 INFEC AGEN DETEC AMPLI PROBE: HCPCS | Performed by: INTERNAL MEDICINE

## 2021-04-26 RX ORDER — UBIQUINOL 100 MG
750 CAPSULE ORAL DAILY
COMMUNITY

## 2021-04-26 RX ORDER — CETIRIZINE HYDROCHLORIDE 10 MG/1
10 TABLET ORAL DAILY
COMMUNITY

## 2021-04-26 RX ORDER — FUROSEMIDE 40 MG/1
40 TABLET ORAL DAILY
COMMUNITY

## 2021-04-26 RX ORDER — DILTIAZEM HYDROCHLORIDE 240 MG/1
240 CAPSULE, EXTENDED RELEASE ORAL 2 TIMES DAILY
COMMUNITY

## 2021-04-26 RX ORDER — MECLIZINE HYDROCHLORIDE 25 MG/1
25 TABLET ORAL 3 TIMES DAILY PRN
COMMUNITY

## 2021-04-26 RX ORDER — MULTIVITAMIN
1 TABLET ORAL DAILY
COMMUNITY

## 2021-04-26 RX ORDER — ALBUTEROL SULFATE 90 UG/1
2 AEROSOL, METERED RESPIRATORY (INHALATION) EVERY 6 HOURS PRN
COMMUNITY

## 2021-04-26 RX ORDER — BRAN 5 G
1 WAFER ORAL DAILY
COMMUNITY

## 2021-04-27 ENCOUNTER — HOSPITAL ENCOUNTER (OUTPATIENT)
Dept: CARDIOLOGY | Age: 66
Discharge: HOME | End: 2021-04-27
Attending: INTERNAL MEDICINE

## 2021-04-27 ENCOUNTER — HOSPITAL ENCOUNTER (OUTPATIENT)
Age: 66
Discharge: HOME | End: 2021-04-27
Attending: INTERNAL MEDICINE | Admitting: INTERNAL MEDICINE

## 2021-04-27 VITALS
OXYGEN SATURATION: 87 % | WEIGHT: 273.15 LBS | HEART RATE: 86 BPM | BODY MASS INDEX: 41.4 KG/M2 | HEIGHT: 68 IN | RESPIRATION RATE: 16 BRPM | SYSTOLIC BLOOD PRESSURE: 116 MMHG | TEMPERATURE: 98.1 F | DIASTOLIC BLOOD PRESSURE: 66 MMHG

## 2021-04-27 DIAGNOSIS — I48.92 ATRIAL FLUTTER, UNSPECIFIED TYPE (CMD): ICD-10-CM

## 2021-04-27 DIAGNOSIS — I48.3 TYPICAL ATRIAL FLUTTER (CMD): ICD-10-CM

## 2021-04-27 PROCEDURE — C1894 INTRO/SHEATH, NON-LASER: HCPCS | Performed by: INTERNAL MEDICINE

## 2021-04-27 PROCEDURE — C1731 CATH, EP, 20 OR MORE ELEC: HCPCS | Performed by: INTERNAL MEDICINE

## 2021-04-27 PROCEDURE — 99152 MOD SED SAME PHYS/QHP 5/>YRS: CPT | Performed by: INTERNAL MEDICINE

## 2021-04-27 PROCEDURE — 10006023 HB SUPPLY 272: Performed by: INTERNAL MEDICINE

## 2021-04-27 PROCEDURE — 93653 COMPRE EP EVAL TX SVT: CPT | Performed by: INTERNAL MEDICINE

## 2021-04-27 PROCEDURE — C2630 CATH, EP, COOL-TIP: HCPCS | Performed by: INTERNAL MEDICINE

## 2021-04-27 PROCEDURE — 10002800 HB RX 250 W HCPCS: Performed by: INTERNAL MEDICINE

## 2021-04-27 PROCEDURE — 13000001 HB PHASE II RECOVERY EA 30 MINUTES: Performed by: INTERNAL MEDICINE

## 2021-04-27 PROCEDURE — C1730 CATH, EP, 19 OR FEW ELECT: HCPCS | Performed by: INTERNAL MEDICINE

## 2021-04-27 PROCEDURE — 93312 ECHO TRANSESOPHAGEAL: CPT

## 2021-04-27 PROCEDURE — 99153 MOD SED SAME PHYS/QHP EA: CPT | Performed by: INTERNAL MEDICINE

## 2021-04-27 PROCEDURE — 10002801 HB RX 250 W/O HCPCS: Performed by: INTERNAL MEDICINE

## 2021-04-27 PROCEDURE — 93613 INTRACARDIAC EPHYS 3D MAPG: CPT | Performed by: INTERNAL MEDICINE

## 2021-04-27 PROCEDURE — C1766 INTRO/SHEATH,STRBLE,NON-PEEL: HCPCS | Performed by: INTERNAL MEDICINE

## 2021-04-27 RX ORDER — BUPIVACAINE HYDROCHLORIDE 5 MG/ML
INJECTION, SOLUTION EPIDURAL; INTRACAUDAL PRN
Status: DISCONTINUED | OUTPATIENT
Start: 2021-04-27 | End: 2021-04-27

## 2021-04-27 RX ORDER — HYDROCODONE BITARTRATE AND ACETAMINOPHEN 5; 325 MG/1; MG/1
1 TABLET ORAL EVERY 4 HOURS PRN
Status: DISCONTINUED | OUTPATIENT
Start: 2021-04-27 | End: 2021-04-27 | Stop reason: HOSPADM

## 2021-04-27 RX ORDER — 0.9 % SODIUM CHLORIDE 0.9 %
2 VIAL (ML) INJECTION EVERY 12 HOURS SCHEDULED
Status: DISCONTINUED | OUTPATIENT
Start: 2021-04-27 | End: 2021-04-27 | Stop reason: HOSPADM

## 2021-04-27 RX ORDER — NALOXONE HCL 0.4 MG/ML
VIAL (ML) INJECTION
Status: DISCONTINUED
Start: 2021-04-27 | End: 2021-04-27 | Stop reason: WASHOUT

## 2021-04-27 RX ORDER — SODIUM CHLORIDE 9 MG/ML
INJECTION, SOLUTION INTRAVENOUS CONTINUOUS
Status: DISCONTINUED | OUTPATIENT
Start: 2021-04-27 | End: 2021-04-27 | Stop reason: HOSPADM

## 2021-04-27 RX ORDER — MIDAZOLAM HYDROCHLORIDE 1 MG/ML
INJECTION, SOLUTION INTRAMUSCULAR; INTRAVENOUS
Status: DISCONTINUED
Start: 2021-04-27 | End: 2021-04-27 | Stop reason: HOSPADM

## 2021-04-27 RX ORDER — ONDANSETRON 4 MG/1
4 TABLET, ORALLY DISINTEGRATING ORAL EVERY 12 HOURS PRN
Status: DISCONTINUED | OUTPATIENT
Start: 2021-04-27 | End: 2021-04-27 | Stop reason: HOSPADM

## 2021-04-27 RX ORDER — SODIUM CHLORIDE 9 MG/ML
INJECTION, SOLUTION INTRAVENOUS
Status: DISPENSED
Start: 2021-04-27 | End: 2021-04-27

## 2021-04-27 RX ORDER — MIDAZOLAM HYDROCHLORIDE 1 MG/ML
INJECTION, SOLUTION INTRAMUSCULAR; INTRAVENOUS PRN
Status: DISCONTINUED | OUTPATIENT
Start: 2021-04-27 | End: 2021-04-27

## 2021-04-27 RX ORDER — ONDANSETRON 2 MG/ML
4 INJECTION INTRAMUSCULAR; INTRAVENOUS EVERY 12 HOURS PRN
Status: DISCONTINUED | OUTPATIENT
Start: 2021-04-27 | End: 2021-04-27 | Stop reason: HOSPADM

## 2021-04-27 RX ORDER — FLUMAZENIL 0.1 MG/ML
INJECTION, SOLUTION INTRAVENOUS
Status: DISCONTINUED
Start: 2021-04-27 | End: 2021-04-27 | Stop reason: WASHOUT

## 2021-04-27 ASSESSMENT — PAIN SCALES - GENERAL
PAINLEVEL_OUTOF10: 0

## 2021-06-10 PROBLEM — Z85.46 HISTORY OF PROSTATE CANCER: Status: ACTIVE | Noted: 2021-06-10

## 2021-06-10 PROBLEM — C61 MALIGNANT NEOPLASM OF PROSTATE (HCC): Status: RESOLVED | Noted: 2020-09-10 | Resolved: 2021-06-10

## 2021-06-10 PROBLEM — I50.32 CHRONIC DIASTOLIC CHF (CONGESTIVE HEART FAILURE) (HCC): Status: ACTIVE | Noted: 2021-06-10

## 2021-10-18 NOTE — H&P (VIEW-ONLY)
Alessandra Ramos. is a 74year old male. Patient presents with:  Consult: consult colon cancer     HPI:      The patient presented with diagnosis of adenocarcinoma of hepatic flexure of colon. Presenting symptoms included none.  Colonoscopy was done fo (SEE COMMENTS)  Adhesive Tape           RASH, OTHER (SEE COMMENTS)  Iv Dye [Radiology C*    RASH  Sulfa Antibiotics       RASH    Comment:Rash on penis and diarrhea   Current Meds:  Current Outpatient Medications   Medication Sig Dispense Refill   • diphen Take five (5) 10mg tablets by mouth seven (7) hours prior to the examination. Take five (5) 10mg tablets by mouth one (1) hour prior to the examination.  (Patient not taking: Reported on 10/19/2021) 15 tablet 0        HISTORY:  Past Medical History:   Porter Sims Shots of liquor per week      Comment: 1-2 mixed drinks per day    Drug use: No       ROS:   GENERAL HEALTH: otherwise feels well; denies weight loss  SKIN: denies any unusual skin lesions or rashes  EYES: no visual complaints or deficits  HEENT: denies na blood borne infections, need to convert to an open procedure, and damage to surrounding structures; as well as the risks with anesthesia including myocardial infarction, respiratory failure, renal failure, pulmonary embolus, deep vein thrombosis, cva, and

## 2021-10-19 PROBLEM — C18.3 MALIGNANT NEOPLASM OF HEPATIC FLEXURE (HCC): Status: ACTIVE | Noted: 2021-10-19

## 2021-11-01 ENCOUNTER — LAB ENCOUNTER (OUTPATIENT)
Dept: LAB | Facility: HOSPITAL | Age: 66
End: 2021-11-01
Attending: SURGERY
Payer: MEDICARE

## 2021-11-01 DIAGNOSIS — Z01.818 PREOPERATIVE TESTING: ICD-10-CM

## 2021-11-01 PROCEDURE — 86901 BLOOD TYPING SEROLOGIC RH(D): CPT

## 2021-11-01 PROCEDURE — 86850 RBC ANTIBODY SCREEN: CPT

## 2021-11-01 PROCEDURE — 36415 COLL VENOUS BLD VENIPUNCTURE: CPT

## 2021-11-01 PROCEDURE — 85025 COMPLETE CBC W/AUTO DIFF WBC: CPT

## 2021-11-01 PROCEDURE — 80048 BASIC METABOLIC PNL TOTAL CA: CPT

## 2021-11-01 PROCEDURE — 82378 CARCINOEMBRYONIC ANTIGEN: CPT

## 2021-11-01 PROCEDURE — 86900 BLOOD TYPING SEROLOGIC ABO: CPT

## 2021-11-02 NOTE — PAT NURSING NOTE
Left message with Jerardo Hyatt at Dr Veronika Su office for alternate antibiotic due to PCN and Cephalosporin anaphylaxis reaction. Invanz contraindicated.

## 2021-11-03 ENCOUNTER — ANESTHESIA (OUTPATIENT)
Dept: SURGERY | Facility: HOSPITAL | Age: 66
DRG: 330 | End: 2021-11-03
Payer: MEDICARE

## 2021-11-03 ENCOUNTER — ANESTHESIA EVENT (OUTPATIENT)
Dept: SURGERY | Facility: HOSPITAL | Age: 66
DRG: 330 | End: 2021-11-03
Payer: MEDICARE

## 2021-11-03 ENCOUNTER — HOSPITAL ENCOUNTER (INPATIENT)
Facility: HOSPITAL | Age: 66
LOS: 1 days | Discharge: HOME | DRG: 330 | End: 2021-11-04
Attending: SURGERY | Admitting: SURGERY
Payer: MEDICARE

## 2021-11-03 DIAGNOSIS — Z01.818 PREOPERATIVE TESTING: Primary | ICD-10-CM

## 2021-11-03 DIAGNOSIS — C18.3 MALIGNANT NEOPLASM OF HEPATIC FLEXURE (HCC): ICD-10-CM

## 2021-11-03 PROBLEM — C18.2 MALIGNANT NEOPLASM OF RIGHT COLON (HCC): Status: ACTIVE | Noted: 2021-11-03

## 2021-11-03 PROCEDURE — 88309 TISSUE EXAM BY PATHOLOGIST: CPT | Performed by: SURGERY

## 2021-11-03 PROCEDURE — 82962 GLUCOSE BLOOD TEST: CPT

## 2021-11-03 PROCEDURE — 88341 IMHCHEM/IMCYTCHM EA ADD ANTB: CPT | Performed by: SURGERY

## 2021-11-03 PROCEDURE — S0073 INJECTION, AZTREONAM, 500 MG: HCPCS | Performed by: SURGERY

## 2021-11-03 PROCEDURE — 5A09357 ASSISTANCE WITH RESPIRATORY VENTILATION, LESS THAN 24 CONSECUTIVE HOURS, CONTINUOUS POSITIVE AIRWAY PRESSURE: ICD-10-PCS | Performed by: PHYSICIAN ASSISTANT

## 2021-11-03 PROCEDURE — 0DTF4ZZ RESECTION OF RIGHT LARGE INTESTINE, PERCUTANEOUS ENDOSCOPIC APPROACH: ICD-10-PCS | Performed by: SURGERY

## 2021-11-03 PROCEDURE — S0030 INJECTION, METRONIDAZOLE: HCPCS | Performed by: PHYSICIAN ASSISTANT

## 2021-11-03 PROCEDURE — S0077 INJECTION, CLINDAMYCIN PHOSP: HCPCS | Performed by: NURSE ANESTHETIST, CERTIFIED REGISTERED

## 2021-11-03 PROCEDURE — 3E0T3BZ INTRODUCTION OF ANESTHETIC AGENT INTO PERIPHERAL NERVES AND PLEXI, PERCUTANEOUS APPROACH: ICD-10-PCS | Performed by: ANESTHESIOLOGY

## 2021-11-03 PROCEDURE — 88342 IMHCHEM/IMCYTCHM 1ST ANTB: CPT | Performed by: SURGERY

## 2021-11-03 PROCEDURE — S0073 INJECTION, AZTREONAM, 500 MG: HCPCS | Performed by: NURSE ANESTHETIST, CERTIFIED REGISTERED

## 2021-11-03 PROCEDURE — 0DNU4ZZ RELEASE OMENTUM, PERCUTANEOUS ENDOSCOPIC APPROACH: ICD-10-PCS | Performed by: SURGERY

## 2021-11-03 PROCEDURE — S0030 INJECTION, METRONIDAZOLE: HCPCS

## 2021-11-03 PROCEDURE — 94002 VENT MGMT INPAT INIT DAY: CPT

## 2021-11-03 PROCEDURE — 94660 CPAP INITIATION&MGMT: CPT

## 2021-11-03 PROCEDURE — C9290 INJ, BUPIVACAINE LIPOSOME: HCPCS | Performed by: SURGERY

## 2021-11-03 RX ORDER — TRAMADOL HYDROCHLORIDE 50 MG/1
50 TABLET ORAL EVERY 6 HOURS PRN
Status: DISCONTINUED | OUTPATIENT
Start: 2021-11-03 | End: 2021-11-04

## 2021-11-03 RX ORDER — MORPHINE SULFATE 10 MG/ML
6 INJECTION, SOLUTION INTRAMUSCULAR; INTRAVENOUS EVERY 10 MIN PRN
Status: DISCONTINUED | OUTPATIENT
Start: 2021-11-03 | End: 2021-11-03 | Stop reason: HOSPADM

## 2021-11-03 RX ORDER — NALOXONE HYDROCHLORIDE 0.4 MG/ML
80 INJECTION, SOLUTION INTRAMUSCULAR; INTRAVENOUS; SUBCUTANEOUS AS NEEDED
Status: DISCONTINUED | OUTPATIENT
Start: 2021-11-03 | End: 2021-11-03 | Stop reason: HOSPADM

## 2021-11-03 RX ORDER — BUPIVACAINE HYDROCHLORIDE 2.5 MG/ML
INJECTION, SOLUTION EPIDURAL; INFILTRATION; INTRACAUDAL AS NEEDED
Status: DISCONTINUED | OUTPATIENT
Start: 2021-11-03 | End: 2021-11-03 | Stop reason: HOSPADM

## 2021-11-03 RX ORDER — SODIUM CHLORIDE, SODIUM LACTATE, POTASSIUM CHLORIDE, CALCIUM CHLORIDE 600; 310; 30; 20 MG/100ML; MG/100ML; MG/100ML; MG/100ML
INJECTION, SOLUTION INTRAVENOUS CONTINUOUS
Status: DISCONTINUED | OUTPATIENT
Start: 2021-11-03 | End: 2021-11-03 | Stop reason: HOSPADM

## 2021-11-03 RX ORDER — DEXTROSE, SODIUM CHLORIDE, AND POTASSIUM CHLORIDE 5; .45; .15 G/100ML; G/100ML; G/100ML
INJECTION INTRAVENOUS CONTINUOUS
Status: DISCONTINUED | OUTPATIENT
Start: 2021-11-03 | End: 2021-11-04

## 2021-11-03 RX ORDER — MORPHINE SULFATE 4 MG/ML
2 INJECTION, SOLUTION INTRAMUSCULAR; INTRAVENOUS EVERY 10 MIN PRN
Status: DISCONTINUED | OUTPATIENT
Start: 2021-11-03 | End: 2021-11-03 | Stop reason: HOSPADM

## 2021-11-03 RX ORDER — FAMOTIDINE 20 MG/1
20 TABLET ORAL ONCE
Status: COMPLETED | OUTPATIENT
Start: 2021-11-03 | End: 2021-11-03

## 2021-11-03 RX ORDER — POLYETHYLENE GLYCOL 3350 17 G/17G
17 POWDER, FOR SOLUTION ORAL DAILY PRN
Status: DISCONTINUED | OUTPATIENT
Start: 2021-11-03 | End: 2021-11-04

## 2021-11-03 RX ORDER — FAMOTIDINE 10 MG/ML
20 INJECTION, SOLUTION INTRAVENOUS 2 TIMES DAILY
Status: DISCONTINUED | OUTPATIENT
Start: 2021-11-03 | End: 2021-11-04

## 2021-11-03 RX ORDER — SODIUM CHLORIDE 9 MG/ML
INJECTION, SOLUTION INTRAVENOUS CONTINUOUS
Status: DISCONTINUED | OUTPATIENT
Start: 2021-11-03 | End: 2021-11-04

## 2021-11-03 RX ORDER — CLINDAMYCIN PHOSPHATE 150 MG/ML
INJECTION, SOLUTION INTRAVENOUS AS NEEDED
Status: DISCONTINUED | OUTPATIENT
Start: 2021-11-03 | End: 2021-11-03 | Stop reason: SURG

## 2021-11-03 RX ORDER — ROCURONIUM BROMIDE 10 MG/ML
INJECTION, SOLUTION INTRAVENOUS AS NEEDED
Status: DISCONTINUED | OUTPATIENT
Start: 2021-11-03 | End: 2021-11-03 | Stop reason: SURG

## 2021-11-03 RX ORDER — FAMOTIDINE 20 MG/1
20 TABLET ORAL 2 TIMES DAILY
Status: DISCONTINUED | OUTPATIENT
Start: 2021-11-03 | End: 2021-11-04

## 2021-11-03 RX ORDER — MAGNESIUM OXIDE 400 MG (241.3 MG MAGNESIUM) TABLET
400 TABLET DAILY
Status: DISCONTINUED | OUTPATIENT
Start: 2021-11-03 | End: 2021-11-04

## 2021-11-03 RX ORDER — PROCHLORPERAZINE EDISYLATE 5 MG/ML
5 INJECTION INTRAMUSCULAR; INTRAVENOUS ONCE AS NEEDED
Status: DISCONTINUED | OUTPATIENT
Start: 2021-11-03 | End: 2021-11-03 | Stop reason: HOSPADM

## 2021-11-03 RX ORDER — ALBUTEROL SULFATE 90 UG/1
2 AEROSOL, METERED RESPIRATORY (INHALATION) EVERY 6 HOURS PRN
Status: DISCONTINUED | OUTPATIENT
Start: 2021-11-03 | End: 2021-11-04

## 2021-11-03 RX ORDER — HYDROCODONE BITARTRATE AND ACETAMINOPHEN 5; 325 MG/1; MG/1
1 TABLET ORAL EVERY 6 HOURS PRN
Qty: 10 TABLET | Refills: 0 | Status: SHIPPED | OUTPATIENT
Start: 2021-11-03 | End: 2021-11-04

## 2021-11-03 RX ORDER — SODIUM CHLORIDE 0.9 % (FLUSH) 0.9 %
10 SYRINGE (ML) INJECTION AS NEEDED
Status: DISCONTINUED | OUTPATIENT
Start: 2021-11-03 | End: 2021-11-04

## 2021-11-03 RX ORDER — ATORVASTATIN CALCIUM 40 MG/1
40 TABLET, FILM COATED ORAL NIGHTLY
Status: DISCONTINUED | OUTPATIENT
Start: 2021-11-03 | End: 2021-11-04

## 2021-11-03 RX ORDER — HALOPERIDOL 5 MG/ML
0.25 INJECTION INTRAMUSCULAR ONCE AS NEEDED
Status: DISCONTINUED | OUTPATIENT
Start: 2021-11-03 | End: 2021-11-03 | Stop reason: HOSPADM

## 2021-11-03 RX ORDER — KETAMINE HYDROCHLORIDE 50 MG/ML
INJECTION, SOLUTION, CONCENTRATE INTRAMUSCULAR; INTRAVENOUS AS NEEDED
Status: DISCONTINUED | OUTPATIENT
Start: 2021-11-03 | End: 2021-11-03 | Stop reason: SURG

## 2021-11-03 RX ORDER — ONDANSETRON 2 MG/ML
INJECTION INTRAMUSCULAR; INTRAVENOUS AS NEEDED
Status: DISCONTINUED | OUTPATIENT
Start: 2021-11-03 | End: 2021-11-03 | Stop reason: SURG

## 2021-11-03 RX ORDER — CLINDAMYCIN PHOSPHATE 900 MG/50ML
900 INJECTION INTRAVENOUS EVERY 8 HOURS
Status: COMPLETED | OUTPATIENT
Start: 2021-11-03 | End: 2021-11-04

## 2021-11-03 RX ORDER — ACETAMINOPHEN 500 MG
1000 TABLET ORAL EVERY 8 HOURS SCHEDULED
Status: DISCONTINUED | OUTPATIENT
Start: 2021-11-03 | End: 2021-11-04

## 2021-11-03 RX ORDER — TAMSULOSIN HYDROCHLORIDE 0.4 MG/1
0.4 CAPSULE ORAL DAILY
Status: DISCONTINUED | OUTPATIENT
Start: 2021-11-03 | End: 2021-11-04

## 2021-11-03 RX ORDER — METRONIDAZOLE 500 MG/100ML
500 INJECTION, SOLUTION INTRAVENOUS EVERY 8 HOURS
Status: COMPLETED | OUTPATIENT
Start: 2021-11-03 | End: 2021-11-04

## 2021-11-03 RX ORDER — ONDANSETRON 2 MG/ML
4 INJECTION INTRAMUSCULAR; INTRAVENOUS EVERY 4 HOURS PRN
Status: DISCONTINUED | OUTPATIENT
Start: 2021-11-03 | End: 2021-11-04

## 2021-11-03 RX ORDER — DILTIAZEM HYDROCHLORIDE 120 MG/1
240 CAPSULE, EXTENDED RELEASE ORAL 2 TIMES DAILY
Status: DISCONTINUED | OUTPATIENT
Start: 2021-11-03 | End: 2021-11-04

## 2021-11-03 RX ORDER — ALVIMOPAN 12 MG/1
12 CAPSULE ORAL 2 TIMES DAILY
Status: DISCONTINUED | OUTPATIENT
Start: 2021-11-04 | End: 2021-11-04

## 2021-11-03 RX ORDER — CLINDAMYCIN PHOSPHATE 900 MG/50ML
900 INJECTION INTRAVENOUS ONCE
Status: DISCONTINUED | OUTPATIENT
Start: 2021-11-03 | End: 2021-11-03 | Stop reason: HOSPADM

## 2021-11-03 RX ORDER — HEPARIN SODIUM 5000 [USP'U]/ML
5000 INJECTION, SOLUTION INTRAVENOUS; SUBCUTANEOUS ONCE
Status: COMPLETED | OUTPATIENT
Start: 2021-11-03 | End: 2021-11-03

## 2021-11-03 RX ORDER — HYDROMORPHONE HYDROCHLORIDE 1 MG/ML
0.6 INJECTION, SOLUTION INTRAMUSCULAR; INTRAVENOUS; SUBCUTANEOUS EVERY 5 MIN PRN
Status: DISCONTINUED | OUTPATIENT
Start: 2021-11-03 | End: 2021-11-03 | Stop reason: HOSPADM

## 2021-11-03 RX ORDER — ACETAMINOPHEN 500 MG
1000 TABLET ORAL ONCE
Status: COMPLETED | OUTPATIENT
Start: 2021-11-03 | End: 2021-11-03

## 2021-11-03 RX ORDER — HEPARIN SODIUM 5000 [USP'U]/ML
5000 INJECTION, SOLUTION INTRAVENOUS; SUBCUTANEOUS EVERY 8 HOURS
Status: DISCONTINUED | OUTPATIENT
Start: 2021-11-04 | End: 2021-11-04

## 2021-11-03 RX ORDER — SODIUM CHLORIDE, SODIUM LACTATE, POTASSIUM CHLORIDE, CALCIUM CHLORIDE 600; 310; 30; 20 MG/100ML; MG/100ML; MG/100ML; MG/100ML
INJECTION, SOLUTION INTRAVENOUS CONTINUOUS
Status: DISCONTINUED | OUTPATIENT
Start: 2021-11-03 | End: 2021-11-04

## 2021-11-03 RX ORDER — ONDANSETRON 2 MG/ML
4 INJECTION INTRAMUSCULAR; INTRAVENOUS ONCE AS NEEDED
Status: DISCONTINUED | OUTPATIENT
Start: 2021-11-03 | End: 2021-11-03 | Stop reason: HOSPADM

## 2021-11-03 RX ORDER — LIDOCAINE HYDROCHLORIDE ANHYDROUS AND DEXTROSE MONOHYDRATE .8; 5 G/100ML; G/100ML
INJECTION, SOLUTION INTRAVENOUS CONTINUOUS PRN
Status: DISCONTINUED | OUTPATIENT
Start: 2021-11-03 | End: 2021-11-03 | Stop reason: SURG

## 2021-11-03 RX ORDER — HYDROCODONE BITARTRATE AND ACETAMINOPHEN 5; 325 MG/1; MG/1
2 TABLET ORAL AS NEEDED
Status: DISCONTINUED | OUTPATIENT
Start: 2021-11-03 | End: 2021-11-03 | Stop reason: HOSPADM

## 2021-11-03 RX ORDER — MORPHINE SULFATE 4 MG/ML
4 INJECTION, SOLUTION INTRAMUSCULAR; INTRAVENOUS EVERY 10 MIN PRN
Status: DISCONTINUED | OUTPATIENT
Start: 2021-11-03 | End: 2021-11-03 | Stop reason: HOSPADM

## 2021-11-03 RX ORDER — LIDOCAINE HYDROCHLORIDE 10 MG/ML
INJECTION, SOLUTION EPIDURAL; INFILTRATION; INTRACAUDAL; PERINEURAL AS NEEDED
Status: DISCONTINUED | OUTPATIENT
Start: 2021-11-03 | End: 2021-11-03 | Stop reason: SURG

## 2021-11-03 RX ORDER — HYDROMORPHONE HYDROCHLORIDE 1 MG/ML
0.4 INJECTION, SOLUTION INTRAMUSCULAR; INTRAVENOUS; SUBCUTANEOUS EVERY 5 MIN PRN
Status: DISCONTINUED | OUTPATIENT
Start: 2021-11-03 | End: 2021-11-03 | Stop reason: HOSPADM

## 2021-11-03 RX ORDER — DOCUSATE SODIUM 100 MG/1
100 CAPSULE, LIQUID FILLED ORAL 2 TIMES DAILY
Status: DISCONTINUED | OUTPATIENT
Start: 2021-11-03 | End: 2021-11-04

## 2021-11-03 RX ORDER — HYDROCODONE BITARTRATE AND ACETAMINOPHEN 5; 325 MG/1; MG/1
1 TABLET ORAL AS NEEDED
Status: DISCONTINUED | OUTPATIENT
Start: 2021-11-03 | End: 2021-11-03 | Stop reason: HOSPADM

## 2021-11-03 RX ORDER — DIPHENHYDRAMINE HYDROCHLORIDE 50 MG/ML
INJECTION INTRAMUSCULAR; INTRAVENOUS AS NEEDED
Status: DISCONTINUED | OUTPATIENT
Start: 2021-11-03 | End: 2021-11-03 | Stop reason: SURG

## 2021-11-03 RX ORDER — METRONIDAZOLE 500 MG/100ML
500 INJECTION, SOLUTION INTRAVENOUS ONCE
Status: COMPLETED | OUTPATIENT
Start: 2021-11-03 | End: 2021-11-03

## 2021-11-03 RX ORDER — LEVOTHYROXINE SODIUM 0.1 MG/1
200 TABLET ORAL
Status: DISCONTINUED | OUTPATIENT
Start: 2021-11-04 | End: 2021-11-04

## 2021-11-03 RX ORDER — HYDROMORPHONE HYDROCHLORIDE 1 MG/ML
0.4 INJECTION, SOLUTION INTRAMUSCULAR; INTRAVENOUS; SUBCUTANEOUS
Status: DISCONTINUED | OUTPATIENT
Start: 2021-11-03 | End: 2021-11-04

## 2021-11-03 RX ORDER — METOCLOPRAMIDE 10 MG/1
10 TABLET ORAL ONCE
Status: COMPLETED | OUTPATIENT
Start: 2021-11-03 | End: 2021-11-03

## 2021-11-03 RX ORDER — AZTREONAM 1 G/1
INJECTION, POWDER, LYOPHILIZED, FOR SOLUTION INTRAMUSCULAR; INTRAVENOUS AS NEEDED
Status: DISCONTINUED | OUTPATIENT
Start: 2021-11-03 | End: 2021-11-03 | Stop reason: SURG

## 2021-11-03 RX ORDER — HYDROMORPHONE HYDROCHLORIDE 1 MG/ML
0.2 INJECTION, SOLUTION INTRAMUSCULAR; INTRAVENOUS; SUBCUTANEOUS
Status: DISCONTINUED | OUTPATIENT
Start: 2021-11-03 | End: 2021-11-04

## 2021-11-03 RX ORDER — DEXTROSE MONOHYDRATE 25 G/50ML
50 INJECTION, SOLUTION INTRAVENOUS
Status: DISCONTINUED | OUTPATIENT
Start: 2021-11-03 | End: 2021-11-03 | Stop reason: HOSPADM

## 2021-11-03 RX ORDER — HYDROMORPHONE HYDROCHLORIDE 1 MG/ML
0.2 INJECTION, SOLUTION INTRAMUSCULAR; INTRAVENOUS; SUBCUTANEOUS EVERY 5 MIN PRN
Status: DISCONTINUED | OUTPATIENT
Start: 2021-11-03 | End: 2021-11-03 | Stop reason: HOSPADM

## 2021-11-03 RX ORDER — SODIUM CHLORIDE 9 MG/ML
INJECTION, SOLUTION INTRAVENOUS CONTINUOUS PRN
Status: DISCONTINUED | OUTPATIENT
Start: 2021-11-03 | End: 2021-11-03 | Stop reason: SURG

## 2021-11-03 RX ORDER — BUPIVACAINE HYDROCHLORIDE 5 MG/ML
INJECTION, SOLUTION EPIDURAL; INTRACAUDAL AS NEEDED
Status: DISCONTINUED | OUTPATIENT
Start: 2021-11-03 | End: 2021-11-03 | Stop reason: HOSPADM

## 2021-11-03 RX ADMIN — ROCURONIUM BROMIDE 20 MG: 10 INJECTION, SOLUTION INTRAVENOUS at 12:25:00

## 2021-11-03 RX ADMIN — SODIUM CHLORIDE: 9 INJECTION, SOLUTION INTRAVENOUS at 11:32:00

## 2021-11-03 RX ADMIN — ROCURONIUM BROMIDE 10 MG: 10 INJECTION, SOLUTION INTRAVENOUS at 14:01:00

## 2021-11-03 RX ADMIN — KETAMINE HYDROCHLORIDE 20 MG: 50 INJECTION, SOLUTION, CONCENTRATE INTRAMUSCULAR; INTRAVENOUS at 12:15:00

## 2021-11-03 RX ADMIN — KETAMINE HYDROCHLORIDE 30 MG: 50 INJECTION, SOLUTION, CONCENTRATE INTRAMUSCULAR; INTRAVENOUS at 11:45:00

## 2021-11-03 RX ADMIN — ROCURONIUM BROMIDE 10 MG: 10 INJECTION, SOLUTION INTRAVENOUS at 14:56:00

## 2021-11-03 RX ADMIN — AZTREONAM 2 G: 1 INJECTION, POWDER, LYOPHILIZED, FOR SOLUTION INTRAMUSCULAR; INTRAVENOUS at 12:00:00

## 2021-11-03 RX ADMIN — LIDOCAINE HYDROCHLORIDE 30 MG: 10 INJECTION, SOLUTION EPIDURAL; INFILTRATION; INTRACAUDAL; PERINEURAL at 11:36:00

## 2021-11-03 RX ADMIN — METRONIDAZOLE 500 MG: 500 INJECTION, SOLUTION INTRAVENOUS at 11:52:00

## 2021-11-03 RX ADMIN — SODIUM CHLORIDE: 9 INJECTION, SOLUTION INTRAVENOUS at 11:43:00

## 2021-11-03 RX ADMIN — ROCURONIUM BROMIDE 10 MG: 10 INJECTION, SOLUTION INTRAVENOUS at 12:38:00

## 2021-11-03 RX ADMIN — CLINDAMYCIN PHOSPHATE 900 MG: 150 INJECTION, SOLUTION INTRAVENOUS at 11:42:00

## 2021-11-03 RX ADMIN — ROCURONIUM BROMIDE 40 MG: 10 INJECTION, SOLUTION INTRAVENOUS at 11:38:00

## 2021-11-03 RX ADMIN — ROCURONIUM BROMIDE 10 MG: 10 INJECTION, SOLUTION INTRAVENOUS at 12:00:00

## 2021-11-03 RX ADMIN — LIDOCAINE HYDROCHLORIDE ANHYDROUS AND DEXTROSE MONOHYDRATE 1.5 MG/MIN: .8; 5 INJECTION, SOLUTION INTRAVENOUS at 12:03:00

## 2021-11-03 RX ADMIN — ROCURONIUM BROMIDE 20 MG: 10 INJECTION, SOLUTION INTRAVENOUS at 13:32:00

## 2021-11-03 RX ADMIN — DIPHENHYDRAMINE HYDROCHLORIDE 12.5 MG: 50 INJECTION INTRAMUSCULAR; INTRAVENOUS at 12:20:00

## 2021-11-03 RX ADMIN — SODIUM CHLORIDE: 9 INJECTION, SOLUTION INTRAVENOUS at 15:52:00

## 2021-11-03 RX ADMIN — ROCURONIUM BROMIDE 10 MG: 10 INJECTION, SOLUTION INTRAVENOUS at 15:01:00

## 2021-11-03 RX ADMIN — ONDANSETRON 4 MG: 2 INJECTION INTRAMUSCULAR; INTRAVENOUS at 13:47:00

## 2021-11-03 NOTE — ANESTHESIA PROCEDURE NOTES
Airway  Date/Time: 11/3/2021 11:40 AM  Urgency: Elective    Airway not difficult    General Information and Staff    Patient location during procedure: OR  Anesthesiologist: Roly Wyman MD  Resident/CRNA: Fox Blanchard CRNA  Performed: CRNA     In

## 2021-11-03 NOTE — INTERVAL H&P NOTE
Pre-op Diagnosis: Malignant neoplasm of hepatic flexure (HCC) [C18.3]    The above referenced H&P was reviewed by Francesca Galarza MD on 11/3/2021, the patient was examined and no significant changes have occurred in the patient's condition since the H&P

## 2021-11-03 NOTE — OPERATIVE REPORT
[facility] POST ANESTHESIA CARE UNIT OPERATIVE REPORT:     PATIENT NAME: Valery Blair   : 1955   MRN: [id number]    DATE OF OPERATION:   21    PREOPERATIVE DIAGNOSIS: Adenocarcinoma of the Ascending Colon     POSTOPERATIVE DIAGNO with LigaSure close to the main trunk of the superior mesenteric artery. The terminal ileum and proximal transverse colon were divided using Endo MARITZA staplers with blue staples.   Side to side functional end-to-end anastomosis was created using a MARITZA stapl

## 2021-11-03 NOTE — ANESTHESIA PREPROCEDURE EVALUATION
Anesthesia PreOp Note    HPI:     Juancarlos Lopez. is a 74year old male who presents for preoperative consultation requested by: Jan Medina MD    Date of Surgery: 11/3/2021    Procedure(s):  laparoscopic right hemicolectomy, lysis of adhesions of prostate ([facility] 4/30/2020   • Mild intermittent asthma without complication 1/16/3354   • Non-insulin dependent type 2 diabetes mellitus ([facility]    • Obstructive apnea 03/24/2021    DMG SPLIT NIGHT AHI 61 BIPAP 25/20   • Prediabetes    • Primary osteoarthr TABLET(80 MG) BY MOUTH DAILY, Disp: 90 tablet, Rfl: 3, 11/2/2021 at 2300  Multiple Vitamins-Minerals (MULTI-VITAMIN/MINERALS) Oral Tab, Take 1 tablet by mouth daily. , Disp: , Rfl: , 10/28/2021  Vitamins-Lipotropics (LIPOFLAVONOID OR), Take by mouth., Disp: Intravenous, Once, Luís Wakefield MD  metRONIDAZOLE in NaCl (FLAGYL) IVPB premix 500 mg, 500 mg, Intravenous, Once, Luís Wakefield MD  0.9% NaCl infusion, , Intravenous, Continuous, Kaden Mason MD, Last Rate: 100 mL/hr at 11/03/21 0933, [facility] Strain:       Difficulty of Paying Living Expenses: Not on file  Food Insecurity:       Worried About [address] in the Last Year: Not on file      Ran Out of Food in the Last Year: Not on file  Transportation Needs:       Lack of Transportation ( oral temperature is 98.6 °F (37 °C). His blood pressure is 154/46 and his pulse is 80. His respiration is 16 and oxygen saturation is 97%.     10/28/21  1215 11/03/21  0907   BP:  154/46   Pulse:  80   Resp:  16   Temp:  98.6 °F (37 °C)   TempSrc:  Oral   S

## 2021-11-03 NOTE — H&P
[facility] Hospitalist H&P       CC: No chief complaint on file.        PCP: Andrew Solitario MD    Date of Admission: 11/3/2021  8:53 AM    ASSESSMENT / PLAN:     Mr. Claribel Eid is a 73 yo M with PMH of DM2, HTN, HL, asthma, colon CA who presented for R hemico Non-insulin dependent type 2 diabetes mellitus ([facility]    • Obstructive apnea 03/24/2021    [facility] SPLIT NIGHT AHI 61 BIPAP 25/20   • Prediabetes    • Primary osteoarthritis involving multiple joints 6/26/2015   • Thyroid activity decreased 3/20/2015   • Unspec BY MOUTH 1/2 HR PRIOR TO STARTING LAXATIVE., Disp: 2 tablet, Rfl: 0  tamsulosin (FLOMAX) cap, Take 1 capsule (0.4 mg total) by mouth daily. , Disp: 90 capsule, Rfl: 0  dilTIAZem HCl  MG Oral Capsule SR 24 Hr, Take 1 capsule (240 mg total) by mouth 2 ( 13.3   NEPRELIM 4.34   WBC 6.1   .0         Recent Labs   Lab 11/01/21  1448   *   BUN 23*   CREATSERUM 0.85   GFRAA 103   GFRNAA 90   CA 9.2      K 4.2      CO2 32.0          No results for input(s): TROP in the last 168 hours.

## 2021-11-03 NOTE — ANESTHESIA POSTPROCEDURE EVALUATION
Patient: Nain Mccullough.     Procedure Summary     Date: 11/03/21 Room / Location: [facility] OR 02 / [facility] OR    Anesthesia Start: 6618 Anesthesia Stop: 9870    Procedure: laparoscopic right hemicolectomy, lysis of adhesions, possible open, Extensive

## 2021-11-04 VITALS
RESPIRATION RATE: 18 BRPM | BODY MASS INDEX: 40.62 KG/M2 | HEIGHT: 68 IN | WEIGHT: 268 LBS | SYSTOLIC BLOOD PRESSURE: 139 MMHG | OXYGEN SATURATION: 96 % | HEART RATE: 75 BPM | DIASTOLIC BLOOD PRESSURE: 50 MMHG | TEMPERATURE: 98 F

## 2021-11-04 PROCEDURE — 87493 C DIFF AMPLIFIED PROBE: CPT | Performed by: HOSPITALIST

## 2021-11-04 PROCEDURE — 85025 COMPLETE CBC W/AUTO DIFF WBC: CPT | Performed by: PHYSICIAN ASSISTANT

## 2021-11-04 PROCEDURE — S0030 INJECTION, METRONIDAZOLE: HCPCS | Performed by: PHYSICIAN ASSISTANT

## 2021-11-04 PROCEDURE — 97530 THERAPEUTIC ACTIVITIES: CPT

## 2021-11-04 PROCEDURE — 84100 ASSAY OF PHOSPHORUS: CPT | Performed by: PHYSICIAN ASSISTANT

## 2021-11-04 PROCEDURE — 97162 PT EVAL MOD COMPLEX 30 MIN: CPT

## 2021-11-04 PROCEDURE — S0028 INJECTION, FAMOTIDINE, 20 MG: HCPCS | Performed by: PHYSICIAN ASSISTANT

## 2021-11-04 PROCEDURE — 97166 OT EVAL MOD COMPLEX 45 MIN: CPT

## 2021-11-04 PROCEDURE — 94640 AIRWAY INHALATION TREATMENT: CPT

## 2021-11-04 PROCEDURE — 80048 BASIC METABOLIC PNL TOTAL CA: CPT | Performed by: PHYSICIAN ASSISTANT

## 2021-11-04 PROCEDURE — 83735 ASSAY OF MAGNESIUM: CPT | Performed by: PHYSICIAN ASSISTANT

## 2021-11-04 PROCEDURE — 82962 GLUCOSE BLOOD TEST: CPT

## 2021-11-04 RX ORDER — PSEUDOEPHEDRINE HCL 30 MG
100 TABLET ORAL 2 TIMES DAILY
Qty: 30 CAPSULE | Refills: 0 | Status: SHIPPED | COMMUNITY
Start: 2021-11-04

## 2021-11-04 RX ORDER — MAGNESIUM OXIDE 400 MG (241.3 MG MAGNESIUM) TABLET
400 TABLET ONCE
Status: DISCONTINUED | OUTPATIENT
Start: 2021-11-04 | End: 2021-11-04

## 2021-11-04 RX ORDER — TRAMADOL HYDROCHLORIDE 50 MG/1
50 TABLET ORAL EVERY 6 HOURS PRN
Qty: 24 TABLET | Refills: 0 | Status: SHIPPED | OUTPATIENT
Start: 2021-11-04

## 2021-11-04 RX ORDER — POTASSIUM CHLORIDE 20 MEQ/1
40 TABLET, EXTENDED RELEASE ORAL ONCE
Status: COMPLETED | OUTPATIENT
Start: 2021-11-04 | End: 2021-11-04

## 2021-11-04 NOTE — DISCHARGE SUMMARY
General Medicine Discharge Summary     Patient ID:  Aundrea Bell.  74year old  4/26/1955    Admit date: 11/3/2021    Discharge date and time: 11/04/21      Attending Physician: Niurka Pardo     Primary Care Physician: Niru Paul (two) times daily.       CONTINUE these medications which have NOT CHANGED    fluticasone-salmeterol (ADVAIR DISKUS) 250-50 MCG/DOSE Inhalation Aerosol Powder, Breath Activated  INHALE 1 PUFF INTO THE LUNGS EVERY 12 HOURS    tamsulosin (FLOMAX) cap  Take 1 [address] [address] [address]  [phone]   Bette Perales MD  Schedule an appointment as soon as possible for a visit in 1 month    [address] [place] [address]  [phone]         No orders of the defined types were

## 2021-11-04 NOTE — RESPIRATORY THERAPY NOTE
Pt states that he does use routinely CPAP when sleeping and would like to use one during hospital stay.

## 2021-11-04 NOTE — PROGRESS NOTES
11/04/21 0700   Clinical Encounter Type   Visited With Patient   Routine Visit Introduction   Surgical Visit Post-op   Referral From Nurse   Referral To       visited pt. Observed pt lying in bed watching tv.  Introduced self and offered

## 2021-11-04 NOTE — PLAN OF CARE
Problem: Patient Centered Care  Goal: Patient preferences are identified and integrated in the patient's plan of care  Description: Interventions:  - What would you like us to know as we care for you?  I live with my wife  - Provide timely, complete, and needs  - Identify cognitive and physical deficits and behaviors that affect risk of falls.   - [place] fall precautions as indicated by assessment.  - Educate pt/family on patient safety including physical limitations  - Instruct pt to call for assistance

## 2021-11-04 NOTE — PLAN OF CARE
AxOx4. S/p surgical. Voiding via penn overnight; to be removed this AM per order. Ambulating with SBA. Pt with multiple small/smear loose BMs overnight. Passing gas. Pain managed with Tramadol PRN & scheduled tylenol. IV abx as ordered.  Tolerating diet; d reports new pain  - Anticipate increased pain with activity and pre-medicate as appropriate  Outcome: Progressing     Problem: SAFETY ADULT - FALL  Goal: Free from fall injury  Description: INTERVENTIONS:  - Assess pt frequently for physical needs  - Ident

## 2021-11-04 NOTE — PROGRESS NOTES
[facility]    General Surgery Progress Note  José Lara.   : 1955  CSN: [id number]  HD# 1    Subjective:   POD#1 laparoscopic right hemicolectomy   No unusual complaints denies abdominal pain, nausea and vomiting  Passing fla 11/04/21  0615   * 155*   BUN 23* 5*   CREATSERUM 0.85 0.57*   GFRAA 103 122   GFRNAA 90 106   CA 9.2 8.3*    140   K 4.2 3.8    110   CO2 32.0 25.0       Lab Results   Component Value Date    WBC 6.3 11/04/2021    HGB 10.9 11/04/2021

## 2021-11-04 NOTE — PHYSICAL THERAPY NOTE
PHYSICAL THERAPY EVALUATION - INPATIENT     Room Number: 445/445-A  Evaluation Date: 11/4/2021  Type of Evaluation: Initial   Physician Order: PT Eval and Treat    Presenting Problem: Pt right colon CA , pt is s/p right hemicolectomy .   Pt with abd sx pre Pt did use bed rails . Pt thinks they  Have a bed rail at home he can use.      Transfers: Modified independent  Gait Assistance: Supervision (Supervision to SBA for pt safety post sx as first time up)  Distance (ft): 440 ft x 1 in hallways      in room am and assist at DC post sx , pt confirms his spouse can provide assist.  Pt reports spouse is a RN . Discussed DC needs , pt declined any need for [facility] PT . Pt reports possibly has RW at home.  Pt could benefit from use of RW at this time and therapy would cullen Essential hypertension with goal blood pressure less than 130/80 8/26/2016   • Extrinsic asthma, unspecified    • High blood pressure    • High cholesterol    • Hypercholesteremia    • Hyperpiesia 3/20/2015   • Hypothyroid    • Hypothyroidism due to acquir 3  Location: s site   Management Techniques: Activity promotion; Body mechanics;Breathing techniques;Relaxation;Repositioning    COGNITION  · Overall Cognitive Status:  WFL - within functional limits    RANGE OF MOTION AND STRENGTH ASSESSMENT    ROM and st of Impairment: 35.83%   Standardized Score (AM-PAC Scale): 47.67   CMS Modifier (G-Code): CJ      Patient End of Session: Up in chair;Needs met;Call light within reach;RN aware of session/findings; All patient questions and concerns addressed    CURRENT GOA

## 2021-11-04 NOTE — PLAN OF CARE
Pt admitted s/p lap hemicolectomy with extensive PRAFUL. Tolerating ice chips and ice water well. Some abdominal pain, endorsed to IVANIA WADDELL [facility] RN. Call light within reach, using appropriately, safe in environment at present.

## 2021-11-04 NOTE — OCCUPATIONAL THERAPY NOTE
OCCUPATIONAL THERAPY EVALUATION - INPATIENT     Room Number: 445/445-A  Evaluation Date: 11/4/2021  Type of Evaluation: Quick Eval  Presenting Problem:  (malignant neoplasm of R colon)    Physician Order: IP Consult to Occupational Therapy  Reason for Th Diagnosis Date   • Arthritis, degenerative 3/20/2015   • Asthma    • Asthma, mild intermittent, uncomplicated 4/45/4783   • Disorder of thyroid    • DJD (degenerative joint disease)    • Essential hypertension 6/26/2015   • Essential hypertension with go Level of [place]: Prior to admission pt was living home with spouse (whom is retired), indep with self care and indep with fx mobility.      SUBJECTIVE  \"I went to the bathroom a few times already\"    OCCUPATIONAL THERAPY EXAMINATION      OBJECTIVE strategies, AE/DME, physiological benefits of functional mobility/OOB activity and POC - pt verbalized understanding. Patient End of Session: Up in chair;Needs met;Call light within reach;RN aware of session/findings; All patient questions and concerns a

## 2023-07-31 ENCOUNTER — OFFICE VISIT (OUTPATIENT)
Dept: AUDIOLOGY | Facility: CLINIC | Age: 68
End: 2023-07-31

## 2023-07-31 ENCOUNTER — OFFICE VISIT (OUTPATIENT)
Dept: OTOLARYNGOLOGY | Facility: CLINIC | Age: 68
End: 2023-07-31
Payer: COMMERCIAL

## 2023-07-31 DIAGNOSIS — H93.13 TINNITUS, BILATERAL: ICD-10-CM

## 2023-07-31 DIAGNOSIS — H91.13 PRESBYCUSIS OF BOTH EARS: ICD-10-CM

## 2023-07-31 DIAGNOSIS — H91.90 HEARING LOSS, UNSPECIFIED HEARING LOSS TYPE, UNSPECIFIED LATERALITY: Primary | ICD-10-CM

## 2023-07-31 DIAGNOSIS — D11.0 BENIGN PAROTID TUMOR: ICD-10-CM

## 2023-07-31 DIAGNOSIS — J35.8 TONSILLAR CYST: ICD-10-CM

## 2023-07-31 DIAGNOSIS — H90.3 SENSORINEURAL HEARING LOSS, BILATERAL: Primary | ICD-10-CM

## 2023-07-31 PROCEDURE — 1160F RVW MEDS BY RX/DR IN RCRD: CPT | Performed by: SPECIALIST

## 2023-07-31 PROCEDURE — 1159F MED LIST DOCD IN RCRD: CPT | Performed by: SPECIALIST

## 2023-07-31 PROCEDURE — 99203 OFFICE O/P NEW LOW 30 MIN: CPT | Performed by: SPECIALIST

## 2023-07-31 RX ORDER — FLUTICASONE FUROATE 100 UG/1
1 POWDER RESPIRATORY (INHALATION) DAILY
COMMUNITY
Start: 2023-07-27

## 2023-07-31 RX ORDER — DILTIAZEM HYDROCHLORIDE 240 MG/1
240 CAPSULE, EXTENDED RELEASE ORAL 2 TIMES DAILY
COMMUNITY

## 2023-07-31 RX ORDER — ROSUVASTATIN CALCIUM 5 MG/1
5 TABLET, COATED ORAL NIGHTLY
COMMUNITY
Start: 2022-11-03

## 2023-07-31 RX ORDER — CYCLOBENZAPRINE HCL 5 MG
1 TABLET ORAL 3 TIMES DAILY PRN
COMMUNITY
Start: 2023-05-17

## 2023-07-31 RX ORDER — FUROSEMIDE 40 MG/1
40 TABLET ORAL DAILY
COMMUNITY

## 2023-07-31 NOTE — PROGRESS NOTES
Layla Ghosh is a 77year old male. Patient presents with:  Hearing Loss: Patient is here for hearing loss , reports ringing in ears x 15 years. HPI:   Patient here with tinnitus bilaterally. Also reports decreased hearing. 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 Was benign. Current Outpatient Medications   Medication Sig Dispense Refill    cyclobenzaprine 5 MG Oral Tab Take 1 tablet (5 mg total) by mouth 3 (three) times daily as needed. dilTIAZem HCl ER Beads 240 MG Oral Capsule SR 24 Hr Take 1 capsule (240 mg total) by mouth 2 (two) times daily. ARNUITY ELLIPTA 100 MCG/ACT Inhalation Aerosol Powder, Breath Activated Inhale 1 puff into the lungs daily. furosemide 40 MG Oral Tab Take 1 tablet (40 mg total) by mouth daily. rosuvastatin 5 MG Oral Tab Take 1 tablet (5 mg total) by mouth nightly. LEVOTHYROXINE 200 MCG Oral Tab TAKE 1 TABLET(200 MCG) BY MOUTH EVERY MORNING BEFORE BREAKFAST 90 tablet 3    tamsulosin (FLOMAX) cap TAKE 1 CAPSULE(0.4 MG) BY MOUTH DAILY 90 capsule 0    DILT- MG Oral Capsule SR 24 Hr TAKE 1 CAPSULE(240 MG) BY MOUTH TWICE DAILY 180 capsule 3    fluticasone-salmeterol (ADVAIR DISKUS) 250-50 MCG/DOSE Inhalation Aerosol Powder, Breath Activated INHALE 1 PUFF INTO THE LUNGS EVERY 12 HOURS 180 each 1    docusate sodium 100 MG Oral Cap Take 100 mg by mouth 2 (two) times daily. 30 capsule 0    traMADol 50 MG Oral Tab Take 1 tablet (50 mg total) by mouth every 6 (six) hours as needed. 24 tablet 0    torsemide 20 MG Oral Tab Take 2 tablets (40 mg total) by mouth daily. 180 tablet 3    aspirin 81 MG Oral Chew Tab Chew by mouth daily. Enalapril Maleate 2.5 MG Oral Tab Take 1 tablet (2.5 mg total) by mouth once daily.  90 tablet 3    SIMVASTATIN 80 MG Oral Tab TAKE 1 TABLET(80 MG) BY MOUTH DAILY 90 tablet 3    Albuterol Sulfate HFA (PROAIR HFA) 108 (90 Base) MCG/ACT Inhalation Aero Soln Inhale 2 puffs into the lungs every 6 (six) hours as needed for Wheezing. 1 Inhaler 3    Glucosamine 750 MG Oral Tab Take by mouth. Multiple Vitamins-Minerals (MULTI-VITAMIN/MINERALS) Oral Tab Take 1 tablet by mouth daily. Vitamins-Lipotropics (LIPOFLAVONOID OR) Take by mouth. acetaminophen 500 MG Oral Tab Take 1 tablet (500 mg total) by mouth every 6 (six) hours as needed for Pain. Cetirizine HCl (ZYRTEC ALLERGY) 10 MG Oral Cap Take by mouth. Meclizine HCl 25 MG Oral Tab Take 1 tablet (25 mg total) by mouth 3 (three) times daily as needed. 30 tablet 3      Past Medical History:   Diagnosis Date    Arthritis, degenerative 3/20/2015    Asthma     Asthma, mild intermittent, uncomplicated 5/26/5250    Disorder of thyroid     DJD (degenerative joint disease)     Essential hypertension 6/26/2015    Essential hypertension with goal blood pressure less than 130/80 8/26/2016    Extrinsic asthma, unspecified     High blood pressure     High cholesterol     Hypercholesteremia     Hyperpiesia 3/20/2015    Hypothyroid     Hypothyroidism due to acquired atrophy of thyroid 6/26/2015    Malignant neoplasm of prostate ([facility] 4/30/2020    Mild intermittent asthma without complication 1/01/7855    Non-insulin dependent type 2 diabetes mellitus ([facility]     Obstructive apnea 03/24/2021    [facility] SPLIT NIGHT AHI 61 BIPAP 25/20    Osteoarthritis     Prediabetes     Primary osteoarthritis involving multiple joints 6/26/2015    Sleep apnea     bipap    Thyroid activity decreased 3/20/2015    Unspecified essential hypertension     Visual impairment     glasses      Social History:  Social History     Socioeconomic History    Marital status: Life Partner   Tobacco Use    Smoking status: Never    Smokeless tobacco: Never   Vaping Use    Vaping Use: Never used   Substance and Sexual Activity    Alcohol use:  Yes     Alcohol/week: 10.0 standard drinks of alcohol     Types: 10 Shots of liquor per week     Comment: 1-2 mixed drinks per day    Drug use: Never        REVIEW OF SYSTEMS: GENERAL HEALTH: feels well otherwise  GENERAL : denies fever, chills, sweats, weight loss, weight gain  SKIN: denies any unusual skin lesions or rashes  RESPIRATORY: denies shortness of breath with exertion  NEURO: denies headaches    EXAM:   There were no vitals taken for this visit. System Details   Skin Inspection - Normal.   Constitutional Overall appearance - Normal.   Head/Face Facial features - Normal. Eyebrows - Normal. Skull - Normal.   Eyes Conjunctiva - Right: Normal, Left: Normal. Pupil - Right: Normal, Left: Normal.    Ears Inspection - Right: Normal, Left: Normal.   Canal - Right: Normal, Left: Normal.   TM - Right: Normal, Left: Normal.   Nasal External nose - Normal.   Nasal septum - Normal.  Turbinates - Normal.   Oral/Oropharynx Lips - Normal, Tonsils -tonsil cyst on the left tongue - Normal    Neck Exam Inspection - Normal. Palpation - Normal. Parotid gland -no parotid tumor by palpation thyroid gland - Normal.   Lymph Detail Submental. Submandibular. Anterior cervical. Posterior cervical. Supraclavicular all without enlargement   Psychiatric Orientation - Oriented to time, place, person & situation. Appropriate mood and affect. Neurological Memory - Normal. Cranial nerves - Cranial nerves II through XII grossly intact. ASSESSMENT AND PLAN:   1. Hearing loss, unspecified hearing loss type, unspecified laterality  Lateral moderate to severe sensorineural hearing loss  - OP REFERRAL TO AUDIOLOGY    2. Presbycusis of both ears  Due to audiogram with patient 100% word discrimination score    3. Tinnitus, bilateral  Sodium and caffeine trial of Lipo    4. Benign parotid tumor  No evidence of recurrent tumor. 5. Tonsillar cyst  On the left incidental finding      The patient indicates understanding of these issues and agrees to the plan.       Tessie Sweet MD  7/31/2023  10:43 AM

## 2023-07-31 NOTE — PATIENT INSTRUCTIONS
You have a left tonsil cyst which is of no concern. An audiogram shows mild to moderate bilateral presbycusis with 100% word discrimination. Continue Lipoflavonoid and reduce your sodium and caffeine for tinnitus.

## 2024-04-26 RX ORDER — IBUPROFEN 200 MG
400 TABLET ORAL EVERY 6 HOURS PRN
COMMUNITY
End: 2024-06-05

## 2024-05-20 ENCOUNTER — LABORATORY ENCOUNTER (OUTPATIENT)
Dept: LAB | Facility: HOSPITAL | Age: 69
End: 2024-05-20
Attending: ORTHOPAEDIC SURGERY

## 2024-05-20 DIAGNOSIS — M43.16 SPONDYLOLISTHESIS OF LUMBAR REGION: ICD-10-CM

## 2024-05-20 DIAGNOSIS — Z01.818 PRE-OP TESTING: ICD-10-CM

## 2024-05-20 DIAGNOSIS — M21.372 LEFT FOOT DROP: ICD-10-CM

## 2024-05-20 DIAGNOSIS — M48.061 LUMBAR STENOSIS: ICD-10-CM

## 2024-05-20 LAB
ANTIBODY SCREEN: NEGATIVE
RH BLOOD TYPE: POSITIVE

## 2024-05-20 PROCEDURE — 86901 BLOOD TYPING SEROLOGIC RH(D): CPT

## 2024-05-20 PROCEDURE — 86850 RBC ANTIBODY SCREEN: CPT

## 2024-05-20 PROCEDURE — 86900 BLOOD TYPING SEROLOGIC ABO: CPT

## 2024-05-20 PROCEDURE — 36415 COLL VENOUS BLD VENIPUNCTURE: CPT

## 2024-05-29 NOTE — H&P
Patient: Ravindra Butler Jr.  Medical Record Number: [id number]  Referring Physician:  No ref. provider found  PCP: Vladimir Kaufman MD        HISTORY OF CHIEF COMPLAINT:    Ravindra Butler Jr. is a 69 year old male, who comes today for pre-op visit     VAS Pain Score: 3 /10                   Past Medical History:   Diagnosis Date    Arthritis, degenerative 3/20/2015    Asthma      Asthma, mild intermittent, uncomplicated 6/26/2015    Disorder of thyroid      DJD (degenerative joint disease)      Essential hypertension 6/26/2015    Essential hypertension with goal blood pressure less than 130/80 8/26/2016    Extrinsic asthma, unspecified      High blood pressure      High cholesterol      Hypercholesteremia      Hyperpiesia 3/20/2015    Hypothyroid      Hypothyroidism due to acquired atrophy of thyroid 6/26/2015    Malignant neoplasm of prostate (HCC) 4/30/2020    Mild intermittent asthma without complication 6/26/2015    Non-insulin dependent type 2 diabetes mellitus (HCC)      Obstructive apnea 03/24/2021     DMG SPLIT NIGHT AHI 61 BIPAP 25/20    Osteoarthritis      Prediabetes      Primary osteoarthritis involving multiple joints 6/26/2015    Sleep apnea       bipap    Thyroid activity decreased 3/20/2015    Unspecified essential hypertension      Visual impairment       glasses            Past Surgical History:   Procedure Laterality Date    BREAST BIOPSY   1966     Dr. Hogan    CHOLECYSTECTOMY        COLECTOMY   11/03/2021      laparoscopic right hemicolectomy, lysis of adhesions, possible open     COLONOSCOPY N/A 9/30/2021     Procedure: COLONOSCOPY, with polypectomy;  Surgeon: Polo Salas MD;  Location: [facility]    COLONOSCOPY N/A 10/3/2022     Procedure: COLONOSCOPY with polypectomy;  Surgeon: Polo Salas MD;  Location: [facility]    NDSC ABLATION & RCNSTJ ATRIA LMTD W/O BYPASS        OTHER SURGICAL HISTORY   05/21/2020     SPACE OAR- Dr. Isaac      OTHER  SURGICAL HISTORY   2020     PNBx- Dr. Bernard     REMOVAL GALLBLADDER                Family History   Problem Relation Age of Onset    Heart Attack Father               Cancer Father           ureteral cancer    Stroke Mother                 Social History    Socioeconomic History      Marital status: Life Partner    Tobacco Use      Smoking status: Never      Smokeless tobacco: Never    Vaping Use      Vaping Use: Never used    Substance and Sexual Activity      Alcohol use: Yes        Alcohol/week: 10.0 standard drinks of alcohol        Types: 10 Shots of liquor per week        Comment: 1-2 mixed drinks per day      Drug use: Never      Current Medications:         Current Outpatient Medications   Medication Sig Dispense Refill    torsemide 20 MG Oral Tab Take 2 tablets (40 mg total) by mouth daily. 180 tablet 3    albuterol (PROAIR HFA) 108 (90 Base) MCG/ACT Inhalation Aero Soln Inhale 2 puffs into the lungs every 6 (six) hours as needed for Wheezing. 1 each 3    dilTIAZem HCl  MG Oral Capsule SR 24 Hr Take 1 capsule (240 mg total) by mouth 2 (two) times daily. 180 capsule 3    rosuvastatin 5 MG Oral Tab Take 1 tablet (5 mg total) by mouth nightly. 90 tablet 3    triamcinolone 0.1 % External Ointment Apply thinly to distal legs for itching relief. Use for 2-3 days only. 15 g 0    tamsulosin (Flomax) cap Take 1 capsule (0.4 mg total) by mouth daily. 90 capsule 3    metFORMIN  MG Oral Tablet 24 Hr Take 1 tablet (500 mg total) by mouth daily with breakfast. 90 tablet 3    fluticasone furoate (ARNUITY ELLIPTA) 100 MCG/ACT Inhalation Aerosol Powder, Breath Activated Inhale 1 puff into the lungs daily. 3 each 3    enalapril 2.5 MG Oral Tab Take 1 tablet (2.5 mg total) by mouth once daily. 90 tablet 3    levothyroxine 200 MCG Oral Tab Take 1 tablet (200 mcg total) by mouth before breakfast. 90 tablet 3    MECLIZINE 25 MG Oral Tab TAKE 1 TABLET(25 MG) BY MOUTH THREE TIMES DAILY AS NEEDED  30 tablet 3    aspirin 81 MG Oral Chew Tab Chew by mouth daily.        Glucosamine 750 MG Oral Tab Take by mouth.        Multiple Vitamins-Minerals (MULTI-VITAMIN/MINERALS) Oral Tab Take 1 tablet by mouth daily.        Vitamins-Lipotropics (LIPOFLAVONOID OR) Take by mouth.        acetaminophen 500 MG Oral Tab Take 500 mg by mouth every 6 (six) hours as needed for Pain.        Cetirizine HCl (ZYRTEC ALLERGY) 10 MG Oral Cap Take by mouth.             REVIEW OF SYSTEMS     A comprehensive 10 point review of systems was completed.  Pertinent positives and negatives noted in the the HPI.      PHYSICAL EXAMIMATION   PHYSICAL EXAMINATION: Ravindra Butler Jr. is a 69 year old male who is observed sitting in the exam room alert and oriented times three.   RESPIRATORY RATE: 18 He looks consistent his stated age.    There were no vitals taken for this visit.  The patient is well developed, appropriately built      Ambulation: Patient displays antalgic gait and unsteady - unable to toe walk or heel walk     Clinical Spinal Alignment: stooped forward slightly      ROM:               ROM testing demonstrates slight decrease forward bending      Palpation:       Patient demonstrates no TTP      Strength:        Patient demonstrates normal motor examination 5/5 except 3/5 left DF      Sensation:  Patient demonstrates normal sensory examination 2/2      Reflexes:        Patient demonstrates diminished lower extremity reflexes      Straight Leg Testing: Was neg      Special Tests:      Test Right   (POS or NEG) Left   (POS or NEG)                           Clonus Neg Neg      EYES: EOMI, RADHA  SKIN EXAM: Skin is intact, head, neck, trunk and arms/legs. No rashes, mottling or ulcerations.  LYMPH EXAM: There is no edema in bilateral lower extremities.  VASCULAR EXAM:  pulses are normal bilaterally, with good distal perfusion. No clubbing or cyanosis. Calves supple, NT   ABDOMINAL EXAM: Abdomen is soft, NT, BS  present  MUSCULOSKELETAL: Outlined above        IMAGING STUDIES:  Imaging studies reviewed with the patient today     MRI:  Narrative   DATE OF SERVICE: 02.28.2024  MRI LUMBAR SPINE WITHOUT CONTRAST    CLINICAL INDICATION: Left leg weakness.    COMPARISON: No relevant prior studies currently available at this institution for direct comparison,  per Epic notes.    TECHNIQUE: Routine noncontrast MRI lumbar spine utilizing standard protocol including sagittal  T1-weighted, T2-weighted, and STIR sequences, and axial T1- and T2-weighted sequences. Closed 1.5  Nuria multichannel MR system was utilized.    FINDINGS: The lowest lumbar type vertebral body is designated L5; if interventional procedures are  contemplated, plain radiographs are strongly recommended to exclude anatomic variations in the  lumbar spine.    ALIGNMENT: Slight retrolisthesis of L1 on L2. Mild retrolisthesis of L2 on L3.    BONE MARROW SIGNAL: Endplate marrow signal adjacent to L2-L3 level(s) with characteristics  consistent with Modic type I and/or type II degenerative changes. Focus of fat or hemangioma within  the L1, L3, and L4 vertebral bodies. Bone marrow signal otherwise appears unremarkable.    CORD/CONUS: Tip of the conus terminates at the lower L1. The distal visualized cord demonstrates  prominence of the central canal which may be within upper limits of normal. However, advise further  evaluation of the cervical and thoracic cord utilizing MRI cervical spine thoracic spine spine  studies.    VERTEBRAL BODY HEIGHTS: Well-maintained    DISCS: Disc desiccation along with notable decrease disc height at multiple levels most significant  at the lower third    DISC LEVELS    T11-T12: Mild posterior facet arthropathy but otherwise grossly unremarkable, based only on sagittal  sequence.    T12-L1: Mild posterior facet arthropathy but otherwise unremarkable.    L1-L2: Mild disc bulge and small posterior focal disc extrusion with disc material  migrating  slightly below the superior endplate of L2. There is mild prominence of the dorsal epidural fat.  Overall, at least mild central spinal canal stenosis. No significant foraminal stenosis.    L2-L3: Mild diffuse disc bulge with suggestion of shallow broad-based left paracentral/medial  foraminal disc protrusion or disc osteophyte complex. There is hypertrophy of the ligamentum flavum  and posterior facets from degenerative changes. There is mild-to-moderate central spinal canal  stenosis and bilateral foraminal stenosis.    L3-L4: Mild diffuse disc bulge and left foraminal disc protrusion and annular tear at least abutting  and slightly flattening the traversing left L4 nerve root. There is hypertrophy of the ligamentum  flavum and posterior facets from degenerative changes. There is at least mild central spinal canal  stenosis and left lateral recess stenosis. Foraminal disc bulge and/or disc osteophyte complex along  with posterior facet arthropathy contribute to mild foraminal stenosis.    L4-L5: Diffuse disc bulge eccentric to the left. There is moderate hypertrophy of the ligamentum  flavum and posterior facets from degenerative changes. Overall, these factors. To severe central  spinal canal stenosis and bilateral lateral recess stenosis with flattening and probable impingement  upon the traversing L5 nerve roots. There is significant crowding of the intrathecal nerve roots.  Foraminal disc bulge and/or disc osteophyte complex along posterior facet arthropathy result in  mild-to-moderate bilateral foraminal stenosis. There is extruded disc component migrating superiorly  into the neuroforamina on sagittal T2 image 13 series.    L5-S1: Mild disc bulge. At least mild hypertrophic posterior facet arthropathy. Otherwise,  unremarkable with no significant central spinal canal or foraminal stenosis.    Evaluation of the following surrounding structures is very suboptimal, but visualized segments/areas  of  the:  ABDOMINAL AORTA: Appears unremarkable.    KIDNEYS: Appear grossly unremarkable.    ADRENAL GLANDS: Appear grossly unremarkable.    PARASPINAL SOFT TISSUES: Cluster of left para-aortic lymph nodes are increased in number although  relatively small size. Advise correlation with contrast-enhanced CT abdomen and pelvis study for  further evaluation.    BONES OF THE PELVIS: Appear grossly unremarkable.    Impression   IMPRESSION:  1. Moderate multilevel lumbar spondylosis with most significant findings at L4-L5 where there is  severe central spinal canal and bilateral lateral recess stenosis. Please see above for details and  additional findings at other levels of the lumbar spine and correlate clinically.     2. Prominence of the central canal of the spinal cord which may be within upper limits of normal.  However, advise further evaluation of the cervical and thoracic cord utilizing noncontrast MRI  cervical spine thoracic spine spine studies.    3. Cluster of left para-aortic lymph nodes are small but increased in number. Advise correlation  with contrast-enhanced CT abdomen and pelvis study for further evaluation.                                                                                             MEDICAL DECISION MAKING:      Plan:   Diagnoses and all orders for this visit:     Left leg weakness     Lumbar radiculopathy     Spondylolisthesis, lumbar region  -     XR LUMBAR SPINE (MIN 4 VIEWS) (CPT=72110); Future           Ravindra Butler Jr. is a 69 year old with L4-5 spondylolisthesis and severe stenosis causing claudication and left foot drop with increase in falls and unsteadiness with gait. I discussed the nature of his condition in extensive detail and treatment options including surgical verus non surgical options.We explored various non-surgical treatment options.       I reviewed the imaging and physical exam findings with the patient.  We discussed the options of living with the symptoms,  continued nonoperative management, or proceeding to surgical intervention.  Ravindra Butler Jr. has had a limitation in age appropriate activities of daily living due to this condition.  Since Ravindra Butler Jr. has not had any improvement with nonoperative management for more than 6 months including physical therapy, medications and injections, Ravindra Butler Jr. would like to consider surgical intervention.  I reviewed the risks and benefits of the surgery including but no limited to infection, incidental durotomy, neuropraxia, worsening symptoms, incomplete pain relief, DVT, PE, and complications related to general anesthesia.  We discussed pre-operative clearance, as well as post-acute care planning of home with services.  We discussed that the estimated length of hospital stay would be 1-2 days.  I printed my pre-operative guided and gave it to the patient for review. The patient will consider the surgery, review with family, and follow up or call if she would like to schedule the surgery.  All of the patient's questions have been sought and answered.     Plan for left L4-5 PTP and PSF with instrumentation and laminectomy, anterior instrumentation L4-5 Plate        The patient indicates understanding of these issues and agrees to the plan.     Thank you very much.      Respectfully yours,        Today I spent a total of 45 minutes evaluating the patient, ordering medication and/or tests, charting, reviewing records and/or imaging, and discussing treatment plan.            Yadira Carbone MD   Minimally Invasive and Complex Spine Surgery Specialist  [facility]

## 2024-05-30 ENCOUNTER — ANESTHESIA EVENT (OUTPATIENT)
Dept: SURGERY | Facility: HOSPITAL | Age: 69
DRG: 454 | End: 2024-05-30
Payer: MEDICARE

## 2024-05-30 ENCOUNTER — ANESTHESIA (OUTPATIENT)
Dept: SURGERY | Facility: HOSPITAL | Age: 69
DRG: 454 | End: 2024-05-30
Payer: MEDICARE

## 2024-05-30 ENCOUNTER — HOSPITAL ENCOUNTER (INPATIENT)
Facility: HOSPITAL | Age: 69
LOS: 6 days | Discharge: NURSING, REHAB OR LONG-TERM CARE | DRG: 454 | End: 2024-06-05
Attending: ORTHOPAEDIC SURGERY | Admitting: ORTHOPAEDIC SURGERY
Payer: MEDICARE

## 2024-05-30 ENCOUNTER — APPOINTMENT (OUTPATIENT)
Dept: GENERAL RADIOLOGY | Facility: HOSPITAL | Age: 69
DRG: 454 | End: 2024-05-30
Attending: ORTHOPAEDIC SURGERY
Payer: MEDICARE

## 2024-05-30 DIAGNOSIS — M43.16 SPONDYLOLISTHESIS OF LUMBAR REGION: ICD-10-CM

## 2024-05-30 DIAGNOSIS — M48.061 LUMBAR STENOSIS: Primary | ICD-10-CM

## 2024-05-30 DIAGNOSIS — Z01.818 PRE-OP TESTING: ICD-10-CM

## 2024-05-30 DIAGNOSIS — M21.372 LEFT FOOT DROP: ICD-10-CM

## 2024-05-30 LAB
GLUCOSE BLD-MCNC: 179 MG/DL (ref 70–99)
GLUCOSE BLD-MCNC: 225 MG/DL (ref 70–99)
GLUCOSE BLD-MCNC: 261 MG/DL (ref 70–99)

## 2024-05-30 PROCEDURE — 76000 FLUOROSCOPY <1 HR PHYS/QHP: CPT | Performed by: ORTHOPAEDIC SURGERY

## 2024-05-30 PROCEDURE — 94640 AIRWAY INHALATION TREATMENT: CPT

## 2024-05-30 PROCEDURE — 94660 CPAP INITIATION&MGMT: CPT

## 2024-05-30 PROCEDURE — 82962 GLUCOSE BLOOD TEST: CPT

## 2024-05-30 PROCEDURE — 0SG0071 FUSION OF LUMBAR VERTEBRAL JOINT WITH AUTOLOGOUS TISSUE SUBSTITUTE, POSTERIOR APPROACH, POSTERIOR COLUMN, OPEN APPROACH: ICD-10-PCS | Performed by: ORTHOPAEDIC SURGERY

## 2024-05-30 PROCEDURE — 4A11X4G MONITORING OF PERIPHERAL NERVOUS ELECTRICAL ACTIVITY, INTRAOPERATIVE, EXTERNAL APPROACH: ICD-10-PCS | Performed by: ORTHOPAEDIC SURGERY

## 2024-05-30 PROCEDURE — 0SG00AJ FUSION OF LUMBAR VERTEBRAL JOINT WITH INTERBODY FUSION DEVICE, POSTERIOR APPROACH, ANTERIOR COLUMN, OPEN APPROACH: ICD-10-PCS | Performed by: ORTHOPAEDIC SURGERY

## 2024-05-30 PROCEDURE — 0ST20ZZ RESECTION OF LUMBAR VERTEBRAL DISC, OPEN APPROACH: ICD-10-PCS | Performed by: ORTHOPAEDIC SURGERY

## 2024-05-30 DEVICE — MODULAR EXTENDED TAB POLYAXIAL REDUCTION TULIP
Type: IMPLANTABLE DEVICE | Site: BACK | Status: FUNCTIONAL
Brand: INVICTUS

## 2024-05-30 DEVICE — LIF AMP, ONE SCREW PLATE, 06 WITH CENTER SCREW, RIGID, GREEN
Type: IMPLANTABLE DEVICE | Site: BACK | Status: FUNCTIONAL
Brand: LIF AMP

## 2024-05-30 DEVICE — LIF AMP, Ø5.5MM X 35MM BONE SCREW X2
Type: IMPLANTABLE DEVICE | Site: BACK | Status: FUNCTIONAL
Brand: AMP

## 2024-05-30 DEVICE — CANNULATED MODULAR SCREW SHANK 6.5MM X 40MM
Type: IMPLANTABLE DEVICE | Site: BACK | Status: FUNCTIONAL
Brand: INVICTUS

## 2024-05-30 DEVICE — GRAFT BNE SUB 10CC ALLGRFT CANC W/ CORT: Type: IMPLANTABLE DEVICE | Site: BACK | Status: FUNCTIONAL

## 2024-05-30 DEVICE — SET SCREW
Type: IMPLANTABLE DEVICE | Site: BACK | Status: FUNCTIONAL
Brand: INVICTUS

## 2024-05-30 DEVICE — TI, MIS LORDOTIC ROD, VI2, 5.5MM X 50MM
Type: IMPLANTABLE DEVICE | Site: BACK | Status: FUNCTIONAL
Brand: INVICTUS

## 2024-05-30 DEVICE — IDENTITI NANOTEC LIF POROUS TI SPACER, 8 X 22 X 60 MM, 15°
Type: IMPLANTABLE DEVICE | Site: BACK | Status: FUNCTIONAL
Brand: IDENTITI NANOTEC

## 2024-05-30 DEVICE — TI, MIS LORDOTIC ROD, VI2, 5.5MM X 45MM
Type: IMPLANTABLE DEVICE | Site: BACK | Status: FUNCTIONAL
Brand: INVICTUS

## 2024-05-30 DEVICE — GRAFT BNE SUB 5CC ALLGRFT CANC W/ CORT FIBERS: Type: IMPLANTABLE DEVICE | Site: BACK | Status: FUNCTIONAL

## 2024-05-30 RX ORDER — ONDANSETRON 2 MG/ML
4 INJECTION INTRAMUSCULAR; INTRAVENOUS EVERY 6 HOURS PRN
Status: DISCONTINUED | OUTPATIENT
Start: 2024-05-30 | End: 2024-05-30 | Stop reason: HOSPADM

## 2024-05-30 RX ORDER — DIAZEPAM 5 MG/ML
5 INJECTION, SOLUTION INTRAMUSCULAR; INTRAVENOUS ONCE
Status: CANCELLED | OUTPATIENT
Start: 2024-05-30 | End: 2024-05-30

## 2024-05-30 RX ORDER — VANCOMYCIN 1.75 GRAM/500 ML IN 0.9 % SODIUM CHLORIDE INTRAVENOUS
15 ONCE
Qty: 500 ML | Refills: 0 | Status: COMPLETED | OUTPATIENT
Start: 2024-05-30 | End: 2024-05-31

## 2024-05-30 RX ORDER — DOCUSATE SODIUM 100 MG/1
100 CAPSULE, LIQUID FILLED ORAL 2 TIMES DAILY
Status: DISCONTINUED | OUTPATIENT
Start: 2024-05-30 | End: 2024-06-05

## 2024-05-30 RX ORDER — MEPERIDINE HYDROCHLORIDE 25 MG/ML
12.5 INJECTION INTRAMUSCULAR; INTRAVENOUS; SUBCUTANEOUS AS NEEDED
Status: DISCONTINUED | OUTPATIENT
Start: 2024-05-30 | End: 2024-05-30 | Stop reason: HOSPADM

## 2024-05-30 RX ORDER — VANCOMYCIN HYDROCHLORIDE 1 G/20ML
INJECTION, POWDER, LYOPHILIZED, FOR SOLUTION INTRAVENOUS AS NEEDED
Status: DISCONTINUED | OUTPATIENT
Start: 2024-05-30 | End: 2024-05-30 | Stop reason: HOSPADM

## 2024-05-30 RX ORDER — ENEMA 19; 7 G/133ML; G/133ML
1 ENEMA RECTAL ONCE AS NEEDED
Status: DISCONTINUED | OUTPATIENT
Start: 2024-05-30 | End: 2024-06-05

## 2024-05-30 RX ORDER — ONDANSETRON 2 MG/ML
INJECTION INTRAMUSCULAR; INTRAVENOUS AS NEEDED
Status: DISCONTINUED | OUTPATIENT
Start: 2024-05-30 | End: 2024-05-30 | Stop reason: SURG

## 2024-05-30 RX ORDER — VANCOMYCIN HYDROCHLORIDE
15 ONCE
Status: COMPLETED | OUTPATIENT
Start: 2024-05-30 | End: 2024-05-30

## 2024-05-30 RX ORDER — ENALAPRIL MALEATE 5 MG/1
2.5 TABLET ORAL
Status: DISCONTINUED | OUTPATIENT
Start: 2024-05-30 | End: 2024-06-05

## 2024-05-30 RX ORDER — CETIRIZINE HYDROCHLORIDE 10 MG/1
10 TABLET ORAL DAILY
Status: DISCONTINUED | OUTPATIENT
Start: 2024-05-31 | End: 2024-06-05

## 2024-05-30 RX ORDER — EPHEDRINE SULFATE 50 MG/ML
INJECTION INTRAVENOUS AS NEEDED
Status: DISCONTINUED | OUTPATIENT
Start: 2024-05-30 | End: 2024-05-30 | Stop reason: SURG

## 2024-05-30 RX ORDER — HYDROMORPHONE HYDROCHLORIDE 1 MG/ML
0.4 INJECTION, SOLUTION INTRAMUSCULAR; INTRAVENOUS; SUBCUTANEOUS EVERY 2 HOUR PRN
Status: DISCONTINUED | OUTPATIENT
Start: 2024-05-30 | End: 2024-06-05

## 2024-05-30 RX ORDER — ROSUVASTATIN CALCIUM 5 MG/1
5 TABLET, COATED ORAL NIGHTLY
Status: DISCONTINUED | OUTPATIENT
Start: 2024-05-30 | End: 2024-06-05

## 2024-05-30 RX ORDER — ACETAMINOPHEN 10 MG/ML
INJECTION, SOLUTION INTRAVENOUS AS NEEDED
Status: DISCONTINUED | OUTPATIENT
Start: 2024-05-30 | End: 2024-05-30 | Stop reason: SURG

## 2024-05-30 RX ORDER — SODIUM CHLORIDE, SODIUM LACTATE, POTASSIUM CHLORIDE, CALCIUM CHLORIDE 600; 310; 30; 20 MG/100ML; MG/100ML; MG/100ML; MG/100ML
INJECTION, SOLUTION INTRAVENOUS CONTINUOUS
Status: DISCONTINUED | OUTPATIENT
Start: 2024-05-30 | End: 2024-06-05

## 2024-05-30 RX ORDER — DOXYCYCLINE HYCLATE 100 MG
100 TABLET ORAL 2 TIMES DAILY
COMMUNITY
Start: 2024-05-21 | End: 2024-06-05

## 2024-05-30 RX ORDER — DIAZEPAM 5 MG/ML
INJECTION, SOLUTION INTRAMUSCULAR; INTRAVENOUS
Status: COMPLETED
Start: 2024-05-30 | End: 2024-05-30

## 2024-05-30 RX ORDER — BISACODYL 10 MG
10 SUPPOSITORY, RECTAL RECTAL
Status: DISCONTINUED | OUTPATIENT
Start: 2024-05-30 | End: 2024-06-05

## 2024-05-30 RX ORDER — OXYCODONE AND ACETAMINOPHEN 10; 325 MG/1; MG/1
1-2 TABLET ORAL
Status: ON HOLD | COMMUNITY
Start: 2024-05-28 | End: 2024-06-05

## 2024-05-30 RX ORDER — SENNOSIDES 8.6 MG
17.2 TABLET ORAL NIGHTLY
Status: DISCONTINUED | OUTPATIENT
Start: 2024-05-30 | End: 2024-06-05

## 2024-05-30 RX ORDER — HYDROMORPHONE HYDROCHLORIDE 1 MG/ML
0.2 INJECTION, SOLUTION INTRAMUSCULAR; INTRAVENOUS; SUBCUTANEOUS EVERY 5 MIN PRN
Status: DISCONTINUED | OUTPATIENT
Start: 2024-05-30 | End: 2024-05-30 | Stop reason: HOSPADM

## 2024-05-30 RX ORDER — DILTIAZEM HYDROCHLORIDE 120 MG/1
240 CAPSULE, EXTENDED RELEASE ORAL 2 TIMES DAILY
Status: DISCONTINUED | OUTPATIENT
Start: 2024-05-30 | End: 2024-06-05

## 2024-05-30 RX ORDER — HYDROMORPHONE HYDROCHLORIDE 1 MG/ML
INJECTION, SOLUTION INTRAMUSCULAR; INTRAVENOUS; SUBCUTANEOUS
Status: COMPLETED
Start: 2024-05-30 | End: 2024-05-30

## 2024-05-30 RX ORDER — METFORMIN HYDROCHLORIDE 500 MG/1
500 TABLET, EXTENDED RELEASE ORAL
COMMUNITY
Start: 2023-10-13

## 2024-05-30 RX ORDER — OXYCODONE AND ACETAMINOPHEN 10; 325 MG/1; MG/1
1 TABLET ORAL EVERY 4 HOURS PRN
Status: DISCONTINUED | OUTPATIENT
Start: 2024-05-30 | End: 2024-06-05

## 2024-05-30 RX ORDER — METHOCARBAMOL 100 MG/ML
INJECTION, SOLUTION INTRAMUSCULAR; INTRAVENOUS AS NEEDED
Status: DISCONTINUED | OUTPATIENT
Start: 2024-05-30 | End: 2024-05-30 | Stop reason: SURG

## 2024-05-30 RX ORDER — DIPHENHYDRAMINE HYDROCHLORIDE 50 MG/ML
12.5 INJECTION INTRAMUSCULAR; INTRAVENOUS AS NEEDED
Status: DISCONTINUED | OUTPATIENT
Start: 2024-05-30 | End: 2024-05-30 | Stop reason: HOSPADM

## 2024-05-30 RX ORDER — HYDROMORPHONE HYDROCHLORIDE 1 MG/ML
0.2 INJECTION, SOLUTION INTRAMUSCULAR; INTRAVENOUS; SUBCUTANEOUS EVERY 2 HOUR PRN
Status: DISCONTINUED | OUTPATIENT
Start: 2024-05-30 | End: 2024-06-05

## 2024-05-30 RX ORDER — TAMSULOSIN HYDROCHLORIDE 0.4 MG/1
0.4 CAPSULE ORAL DAILY
Status: DISCONTINUED | OUTPATIENT
Start: 2024-05-31 | End: 2024-06-05

## 2024-05-30 RX ORDER — TRANEXAMIC ACID 10 MG/ML
INJECTION, SOLUTION INTRAVENOUS AS NEEDED
Status: DISCONTINUED | OUTPATIENT
Start: 2024-05-30 | End: 2024-05-30 | Stop reason: SURG

## 2024-05-30 RX ORDER — PREDNISONE 20 MG/1
TABLET ORAL
COMMUNITY
Start: 2024-04-30

## 2024-05-30 RX ORDER — NALOXONE HYDROCHLORIDE 0.4 MG/ML
0.08 INJECTION, SOLUTION INTRAMUSCULAR; INTRAVENOUS; SUBCUTANEOUS AS NEEDED
Status: DISCONTINUED | OUTPATIENT
Start: 2024-05-30 | End: 2024-05-30 | Stop reason: HOSPADM

## 2024-05-30 RX ORDER — DIPHENHYDRAMINE HYDROCHLORIDE 50 MG/ML
25 INJECTION INTRAMUSCULAR; INTRAVENOUS EVERY 4 HOURS PRN
Status: DISCONTINUED | OUTPATIENT
Start: 2024-05-30 | End: 2024-06-05

## 2024-05-30 RX ORDER — LIDOCAINE HYDROCHLORIDE ANHYDROUS AND DEXTROSE MONOHYDRATE .8; 5 G/100ML; G/100ML
INJECTION, SOLUTION INTRAVENOUS CONTINUOUS PRN
Status: DISCONTINUED | OUTPATIENT
Start: 2024-05-30 | End: 2024-05-30 | Stop reason: SURG

## 2024-05-30 RX ORDER — SODIUM CHLORIDE, SODIUM LACTATE, POTASSIUM CHLORIDE, CALCIUM CHLORIDE 600; 310; 30; 20 MG/100ML; MG/100ML; MG/100ML; MG/100ML
INJECTION, SOLUTION INTRAVENOUS CONTINUOUS
Status: DISCONTINUED | OUTPATIENT
Start: 2024-05-30 | End: 2024-05-30 | Stop reason: HOSPADM

## 2024-05-30 RX ORDER — GLYCOPYRROLATE 0.2 MG/ML
INJECTION, SOLUTION INTRAMUSCULAR; INTRAVENOUS AS NEEDED
Status: DISCONTINUED | OUTPATIENT
Start: 2024-05-30 | End: 2024-05-30 | Stop reason: SURG

## 2024-05-30 RX ORDER — ALBUTEROL SULFATE 90 UG/1
2 AEROSOL, METERED RESPIRATORY (INHALATION) EVERY 6 HOURS PRN
Status: DISCONTINUED | OUTPATIENT
Start: 2024-05-30 | End: 2024-06-05

## 2024-05-30 RX ORDER — POLYETHYLENE GLYCOL 3350 17 G/17G
17 POWDER, FOR SOLUTION ORAL DAILY PRN
Status: DISCONTINUED | OUTPATIENT
Start: 2024-05-30 | End: 2024-06-05

## 2024-05-30 RX ORDER — TRIAMCINOLONE ACETONIDE 1 MG/G
OINTMENT TOPICAL
COMMUNITY
Start: 2023-12-12

## 2024-05-30 RX ORDER — METHOCARBAMOL 750 MG/1
1 TABLET, FILM COATED ORAL 3 TIMES DAILY
COMMUNITY
Start: 2024-05-28

## 2024-05-30 RX ORDER — OXYCODONE AND ACETAMINOPHEN 10; 325 MG/1; MG/1
2 TABLET ORAL EVERY 4 HOURS PRN
Status: DISCONTINUED | OUTPATIENT
Start: 2024-05-30 | End: 2024-06-05

## 2024-05-30 RX ORDER — METOCLOPRAMIDE HYDROCHLORIDE 5 MG/ML
10 INJECTION INTRAMUSCULAR; INTRAVENOUS EVERY 8 HOURS PRN
Status: DISCONTINUED | OUTPATIENT
Start: 2024-05-30 | End: 2024-06-05

## 2024-05-30 RX ORDER — METHOCARBAMOL 750 MG/1
750 TABLET, FILM COATED ORAL 3 TIMES DAILY
Status: DISCONTINUED | OUTPATIENT
Start: 2024-05-30 | End: 2024-06-05

## 2024-05-30 RX ORDER — OXYCODONE HYDROCHLORIDE 5 MG/1
10 TABLET ORAL ONCE AS NEEDED
Status: CANCELLED | OUTPATIENT
Start: 2024-05-30 | End: 2024-05-30

## 2024-05-30 RX ORDER — DIPHENHYDRAMINE HCL 25 MG
25 CAPSULE ORAL EVERY 4 HOURS PRN
Status: DISCONTINUED | OUTPATIENT
Start: 2024-05-30 | End: 2024-06-05

## 2024-05-30 RX ORDER — LEVOTHYROXINE SODIUM 0.2 MG/1
200 TABLET ORAL
Status: DISCONTINUED | OUTPATIENT
Start: 2024-05-31 | End: 2024-06-05

## 2024-05-30 RX ORDER — ACETAMINOPHEN 500 MG
1000 TABLET ORAL ONCE
Status: DISCONTINUED | OUTPATIENT
Start: 2024-05-30 | End: 2024-05-30 | Stop reason: HOSPADM

## 2024-05-30 RX ORDER — LABETALOL HYDROCHLORIDE 5 MG/ML
5 INJECTION, SOLUTION INTRAVENOUS EVERY 10 MIN PRN
Status: DISCONTINUED | OUTPATIENT
Start: 2024-05-30 | End: 2024-05-30 | Stop reason: HOSPADM

## 2024-05-30 RX ORDER — ONDANSETRON 2 MG/ML
4 INJECTION INTRAMUSCULAR; INTRAVENOUS EVERY 6 HOURS PRN
Status: DISCONTINUED | OUTPATIENT
Start: 2024-05-30 | End: 2024-06-05

## 2024-05-30 RX ORDER — HYDROMORPHONE HYDROCHLORIDE 1 MG/ML
0.4 INJECTION, SOLUTION INTRAMUSCULAR; INTRAVENOUS; SUBCUTANEOUS EVERY 5 MIN PRN
Status: DISCONTINUED | OUTPATIENT
Start: 2024-05-30 | End: 2024-05-30 | Stop reason: HOSPADM

## 2024-05-30 RX ORDER — OXYCODONE HYDROCHLORIDE 5 MG/1
5 TABLET ORAL ONCE AS NEEDED
Status: CANCELLED | OUTPATIENT
Start: 2024-05-30 | End: 2024-05-30

## 2024-05-30 RX ORDER — DEXAMETHASONE SODIUM PHOSPHATE 4 MG/ML
VIAL (ML) INJECTION AS NEEDED
Status: DISCONTINUED | OUTPATIENT
Start: 2024-05-30 | End: 2024-05-30 | Stop reason: SURG

## 2024-05-30 RX ORDER — LIDOCAINE HYDROCHLORIDE 10 MG/ML
INJECTION, SOLUTION EPIDURAL; INFILTRATION; INTRACAUDAL; PERINEURAL AS NEEDED
Status: DISCONTINUED | OUTPATIENT
Start: 2024-05-30 | End: 2024-05-30 | Stop reason: SURG

## 2024-05-30 RX ORDER — DIAZEPAM 5 MG/ML
5 INJECTION, SOLUTION INTRAMUSCULAR; INTRAVENOUS ONCE
Status: COMPLETED | OUTPATIENT
Start: 2024-05-30 | End: 2024-05-30

## 2024-05-30 RX ADMIN — ACETAMINOPHEN 1000 MG: 10 INJECTION, SOLUTION INTRAVENOUS at 12:39:00

## 2024-05-30 RX ADMIN — ONDANSETRON 4 MG: 2 INJECTION INTRAMUSCULAR; INTRAVENOUS at 12:39:00

## 2024-05-30 RX ADMIN — METHOCARBAMOL 750 MG: 100 INJECTION, SOLUTION INTRAMUSCULAR; INTRAVENOUS at 12:39:00

## 2024-05-30 RX ADMIN — LIDOCAINE HYDROCHLORIDE ANHYDROUS AND DEXTROSE MONOHYDRATE 2 MG/KG/HR: .8; 5 INJECTION, SOLUTION INTRAVENOUS at 10:27:00

## 2024-05-30 RX ADMIN — LIDOCAINE HYDROCHLORIDE 50 MG: 10 INJECTION, SOLUTION EPIDURAL; INFILTRATION; INTRACAUDAL; PERINEURAL at 10:24:00

## 2024-05-30 RX ADMIN — EPHEDRINE SULFATE 5 MG: 50 INJECTION INTRAVENOUS at 11:05:00

## 2024-05-30 RX ADMIN — DEXAMETHASONE SODIUM PHOSPHATE 4 MG: 4 MG/ML VIAL (ML) INJECTION at 10:27:00

## 2024-05-30 RX ADMIN — GLYCOPYRROLATE 0.2 MG: 0.2 INJECTION, SOLUTION INTRAMUSCULAR; INTRAVENOUS at 11:05:00

## 2024-05-30 RX ADMIN — SODIUM CHLORIDE, SODIUM LACTATE, POTASSIUM CHLORIDE, CALCIUM CHLORIDE: 600; 310; 30; 20 INJECTION, SOLUTION INTRAVENOUS at 10:21:00

## 2024-05-30 RX ADMIN — VANCOMYCIN HYDROCHLORIDE 1.5 G: at 10:27:00

## 2024-05-30 RX ADMIN — TRANEXAMIC ACID 1000 MG: 10 INJECTION, SOLUTION INTRAVENOUS at 10:28:00

## 2024-05-30 RX ADMIN — SODIUM CHLORIDE, SODIUM LACTATE, POTASSIUM CHLORIDE, CALCIUM CHLORIDE: 600; 310; 30; 20 INJECTION, SOLUTION INTRAVENOUS at 13:15:00

## 2024-05-30 NOTE — ANESTHESIA POSTPROCEDURE EVALUATION
[facility]    Ravindra Butler  Patient Status:  Inpatient   Age/Gender 69 year old male MRN [id number]   Location [facility] SURGERY Attending Yadira Carbone MD   Hosp Day # 0 PCP Vladimir Kaufman MD       Anesthesia Post-op Note    LEFT LUMBAR 4 - LUMBAR 5 PRONE LATERAL INTERBODY FUSION, POSTERIOR SPINAL FUSION WITH INSTRUMENTATION, LAMINECTOMY, ANTERIOR INSTRUMENTATION    Procedure Summary       Date: 05/30/24 Room / Location:  MAIN OR 11 / [facility] MAIN OR    Anesthesia Start: 1019 Anesthesia Stop: 1317    Procedures:       LEFT LUMBAR 4 - LUMBAR 5 PRONE LATERAL INTERBODY FUSION, POSTERIOR SPINAL FUSION WITH INSTRUMENTATION, LAMINECTOMY, ANTERIOR INSTRUMENTATION (Spine Lumbar)      INTRAOPERATIVE NEURO MONITORING      . (Spine Lumbar) Diagnosis: (LUMBAR STENOSIS; LEFT FOOT DROP; LUMBAR SPONDYLOLITHESIS)    Surgeons: Yadira Carbone MD Anesthesiologist: Aj Chaparro MD    Anesthesia Type: general ASA Status: 3            Anesthesia Type: general    Vitals Value Taken Time   /58 05/30/24 1325   Temp 98.3 05/30/24 1327   Pulse 72 05/30/24 1326   Resp 17 05/30/24 1326   SpO2 96 % 05/30/24 1327   Vitals shown include unfiled device data.    Patient Location: PACU    Anesthesia Type: general    Airway Patency: patent    Postop Pain Control: adequate    Mental Status: mildly sedated but able to meaningfully participate in the post-anesthesia evaluation    Nausea/Vomiting: none    Cardiopulmonary/Hydration status: stable euvolemic    Complications: no apparent anesthesia related complications    Postop vital signs: stable    Dental Exam: Unchanged from Preop    Patient to be discharged from PACU when criteria met.

## 2024-05-30 NOTE — PROGRESS NOTES
NURSING ADMISSION NOTE      Patient admitted via  Bed  Oriented to room.  Safety precautions initiated.  Bed in low position.  Call light in reach.

## 2024-05-30 NOTE — DISCHARGE INSTRUCTIONS
Yadira Carbone MD        Lumbar and Thoracic Spinal Fusion Discharge Instructions   [facility] Telephone Number: [phone]  Activity  Mobility  Walking is highly encouraged throughout the day, as tolerated.   Smaller distances are more easily tolerated.  Your goal is to increase the distance you walk each day.  Remember to listen to your body.  Exercise caution in adverse weather or terrain conditions.    Stairs  You may ascend and descend stairs as you tolerate.   Be safe and deliberate. Hold the handrail and advance one step at a time.  Avoid step-stools and ladders.     Restrictions  No twisting, pulling, pushing, or lifting items with a force greater than 10 pounds. (~Gallon of Milk)  No lifting objects above the level of your shoulders.  No bending with your back - you may bend at the knees and hips, maintaining a straight back.  Typically, your restrictions will be lessened at your 6-week follow-up appointment, however we appreciate your compliance with restrictions until directed otherwise.   If you have been provided a walking aid such as a walker or cane, please use as directed.    Sitting  Fatigue is common after surgery and you may find respite in sitting. This is normal and encouraged. Please remember to be deliberately mobile throughout the day. Change your position frequently, as your muscles may get sore and stiff without movement.    It is recommended to sit in a chair which allows your knees to be at about the same level as your hips. This makes rising from a seated position more feasible.   Avoid overstuffed or plush chairs. Medium to firm chairs with straight backs give better support.   Recliners are OK but you may need to add pillows to avoid sinking into the chair.  Use a raised toilet seat if needed.  Change position every 20-30 minutes throughout the day (example: after sitting, stand, then you can sit again for another 20-30 minutes).    Sexual Activity  You may resume presurgical  sexual activity two weeks following surgery as tolerated and when approved by your surgeon.  Discontinue sexual activity if it causes or exacerbates your pain.     Exercise/Fitness Activity  Do not participate in this activity during the two weeks following surgery, unless instructed otherwise.  Your surgeon will lessen restrictions and progress your activity level when appropriate.     Driving  You may NOT drive while taking narcotics or muscle relaxants.  Back and leg pain have been shown to decrease breaking reaction time, particularly after surgery, however, there is no specific time to return to driving.   When you feel able and safe to drive you may return to driving. Slowly advance the complexity of your driving from short distance driving on local streets and in parking lots to longer distance driving including highway driving.     Travel  Avoid air travel and long-distance automobile travel the first two weeks following surgery.     Bracing/Corset  You may not require an immobilizer or brace for your back unless your surgeon has indicated otherwise.   If you have been provided a brace or corset  Use when out of bed except when showering.  You may wear even when toileting.  Anticipate wearing for 6-12 weeks after surgery; exact time to be determined by surgeon.   Apply/remove your brace when sitting/standing at side of your bed.     Sleeping  You will require plenty of rest and sleep after surgery.   Use the position that provides you the most comfort.  You may use a pillow under knees, between legs, or behind back (if lying on your side), for comfort.   Log roll to turn and rise from a recumbent position.   You may have difficulty sleeping at night. This is not abnormal. If you experience this, try to avoid napping during the day.   It is common to fatigue easily after surgery, this will typically improve one month after your surgery.   Maintain clean sheets and pillow cases.   Rub, with a clean towel to  dry.     Return to work  When cleared by surgeon. Discuss specific work activities with your surgeon at your follow-up visit.  Light/sedentary type work may be possible 2 weeks post-op.  Most work activities are permitted approximately 6-12 weeks after surgery.     Dressing and Wound Care  You will have a water-resistant, clear, adhesive dressing over your wound. Please leave this dressing in place for 5 days after surgery.   Remove dressing 5 days after surgery and inspect your wound. You will find glue and mesh tape over your wound, please leave these intact. The dressing should remain in place after discharge from the hospital, as long as it is intact, with a good seal, and there is no leakage.    Start dressing changes 5 days after leaving the hospital with gauze and tegaderm.  Change as needed for moisture in the dressing.    You may start showering 3 days after leaving the hospital.  Keep all dressings on while taking a shower, then place a clean and dry dressing after showering. Do not scrub over the dressing.  Always wash hands before and after dressing changes.   Watch incision for any worsening redness, increased drainage, increased warmth or opening of the incision.  Call your surgeon’s office/answering service if you notice any of these.  Do not apply lotions or ointments on or near incision.    Bathing  You may shower over the adhesive dressing (Tegaderm) that is in place starting 3 days after surgery.  Have someone inspect your dressing prior to each shower to ensure the integrity of the seal at the dressing edges. If the bandage integrity is compromised, please apply an additional adhesive over the existing dressing.   Do not submerge your wound. No tub bathing, swimming, use of steam rooms or saunas for 6 weeks following surgery and when permitted by your surgeon.  Do not scrub your incision site however you may allow soapy water to run over the site after your first post-operative visit.   Dab the  site dry with a clean towel.    Diet and Constipation Prevention  Your appetite may be poor. Your desire for food will return.  Drink plenty of liquids (water, juice) each day to prevent dehydration.  Maintain a healthy, well balanced diet that includes plenty of protein following surgery.  Foods that are high in fiber (bran, vegetables, fruit) are good ways to prevent constipation.   Use an over the counter stool softener while taking narcotics to prevent constipation; the use of medications such a Miralax or Milk of Magnesia may be needed.  An enema or glycerin suppository may be necessary if above measures do not work.  Contact your pharmacist, family doctor, or surgeon for advice.    No Smoking  Smoking will inhibit healing.  Smoking has been clearly shown to inhibit solid bony fusion and is counterproductive to the nature of your surgery.   Even one cigarette a day may cause problems.  Vaping, chewing tobacco, nicotine gum and patches will also inhibit healing.    Pain Management  Expectations  You will have incisional site pain. This is normal and expected after surgery.   You may continue to have leg symptoms which should improve over time.   It is not uncommon 48-72 hours after surgery for patients to experience worsening pain symptoms or return of leg pain/symptoms as post-surgical inflammation sets in. Do not be alarmed as this should gradually improve.   Pain after surgery is normal.  You may have some return of your pre-surgery symptoms and this is normal. Overall your pain should slowly decrease.  You may have good and bad days.  The pain medication Oxycodone may be taken every 4-6 hours as needed for pain.    You can take one extra strength (500mg) Acetaminophen every 6 hours as needed for pain, do not exceed 3500 mg per 24 hours.  If you received Norco instead of Oxycodone; you can take 1-2 tablets of Norco as needed for pain. Norco contains 325 mg of Acetaminophen, so factor that in when  supplementing extra strength Tylenol (500mg) Acetaminophen, to not exceed the 3500mg limit per 24 hours.  The muscle relaxer (Flexeril) should be taken 1-3 times daily as needed for muscle tightness and spasm.  You MUST NOT take any anti-inflammatory pain medications for 3-6 months after surgery as these can prevent spinal fusion.  Which include: Aspirin, Motrin, Advil, Aleve, Naprosyn, Voltaren, Mobic, Indocin, Meloxicam, Ibuprofen, Indomethacin, Naproxen, Diclofenac. (COMPLETE LIST IN GUIDEBOOK)      Non-medication Based Techniques  Relaxation techniques  A way to focus your attention other than on your pain. You are innately capable of producing endorphins, a naturally occurring hormone, that can decrease pain. Some examples of relaxation techniques which may result in a release of endorphins include deep breathing, listening to music, prayer, or meditation.   The use of cold therapy for 20 minutes multiple times per day is encouraged.  You may apply heat to areas of muscle spasm only. Do not apply heat directly over your wound as this can lead to swelling and increased pain.   Gentle stretching may ease muscle spasm.  The idea is to “lengthen” the muscle that is in spasm. Avoid any aggressive bending, lifting, twisting with your neck or back. Gently bending and stretching is OK.  Gentle massage applied to the muscle spasm may help reduce discomfort.    Medication  Tylenol (acetaminophen) is another excellent medication for pain. Take caution as most narcotics contain acetaminophen. You may take a combined daily maximum 3.5 grams (3500mg) of Tylenol (acetaminophen) in 24 hours. More than this can lead to liver injury.   You will be prescribed a narcotic medication. Take this as needed for breakthrough pain. Gradually wean yourself from prescription medication. You may substitute for Tylenol (acetaminophen).  Consider taking pain medication 30 minutes prior to activity to avoid incisional pain.   Take muscle  relaxants as needed only if experiencing muscle spasm.  Please allow medications 30-45 minutes to take effect.  Do NOT drink alcohol while on pain medication.  Monitor need for pain medication refills closely.   Call your pharmacy or physician’s office at least five days before you run out of pain medication. If calling physician office after 3 pm, requests will be addressed on the next business day. Calls for refills on Fridays, holidays, and weekends may result in a processing delay of your refill until the next business day.     Post-op Office Visit  Patients are routinely seen 2 weeks, 6 weeks, 3 months, 6 months, and 1 year after surgery.   You will be scheduled for a post-surgical visit two weeks after surgery. Please confirm this appointment.  It is very important that you keep this appointment.  We recommend making a list of questions to bring with you as it is not uncommon for patients to forget questions while being seen.  Schedule a one week follow up with your Primary Care Physician. It is a good opportunity to review all your medications including any new additions we may provide.     When to contact your surgeon’s team:  Temperature > 101.5°F or 38.5°C.   Increasing pain, swelling, redness, odor, or drainage from your incision.  Separation of incision.  Sudden reappearance of pain that won’t go away with pain medication.  New numbness or weakness to arms or legs.  Difficulty urinating or if incontinence of your bowel.   Headaches that worsen when standing/sitting and resolve when laid flat.  Any concerns, unanswered questions, or new problems.     Go directly to the ER or CALL 911 if you:  become short of breath  have chest pain  cough up blood  have unexplained anxiety with breathing

## 2024-05-30 NOTE — INTERVAL H&P NOTE
Pre-op Diagnosis: LUMBAR STENOSIS; LEFT FOOT DROP; LUMBAR SPONDYLOLITHESIS    The above referenced H&P was reviewed by CATHY Castro on 5/30/2024, the patient was examined and no significant changes have occurred in the patient's condition since the H&P was performed.  I discussed with the patient and/or legal representative the potential benefits, risks and side effects of this procedure; the likelihood of the patient achieving goals; and potential problems that might occur during recuperation.  I discussed reasonable alternatives to the procedure, including risks, benefits and side effects related to the alternatives and risks related to not receiving this procedure.  We will proceed with procedure as planned.

## 2024-05-30 NOTE — ANESTHESIA PREPROCEDURE EVALUATION
PRE-OP EVALUATION    Patient Name: Ravindra Butler Jr.    Admit Diagnosis: LUMBAR STENOSIS; LEFT FOOT DROP; LUMBAR SPONDYLOLITHESIS    Pre-op Diagnosis: LUMBAR STENOSIS; LEFT FOOT DROP; LUMBAR SPONDYLOLITHESIS    LEFT LUMBAR 4 - LUMBAR 5 PRONE LATERAL INTERBODY FUSION, POSTERIOR SPINAL FUSION WITH INSTRUMENTATION, LAMINECTOMY, ANTERIOR INSTRUMENTATION    Anesthesia Procedure: LEFT LUMBAR 4 - LUMBAR 5 PRONE LATERAL INTERBODY FUSION, POSTERIOR SPINAL FUSION WITH INSTRUMENTATION, LAMINECTOMY, ANTERIOR INSTRUMENTATION (Spine Lumbar)  INTRAOPERATIVE NEURO MONITORING  . (Spine Lumbar)    Surgeons and Role:     * Yadira Carbone MD - Primary    Pre-op vitals reviewed.        Body mass index is 34.41 kg/m².    Current medications reviewed.  Hospital Medications:  • acetaminophen (Tylenol Extra Strength) tab 1,000 mg  1,000 mg Oral Once   • lactated ringers infusion   Intravenous Continuous   • vancomycin (Vancocin) 1.5 g in sodium chloride 0.9% 250mL IVPB premix  15 mg/kg Intravenous Once    And   • gentamicin (Garamycin) 420 mg in sodium chloride 0.9% 100 mL IVPB  5 mg/kg (Adjusted) Intravenous Once   • clonidine-EPINEPHrine-ropivacaine-ketorolac pain cocktail syringe (OR)   Injection Once (Intra-Op)       Outpatient Medications:     Medications Prior to Admission   Medication Sig Dispense Refill Last Dose   • ibuprofen (ADVIL) 200 MG Oral Tab Take 2 tablets (400 mg total) by mouth every 6 (six) hours as needed for Pain.      • cyclobenzaprine 5 MG Oral Tab Take 1 tablet (5 mg total) by mouth 3 (three) times daily as needed.      • dilTIAZem HCl ER Beads 240 MG Oral Capsule SR 24 Hr Take 1 capsule (240 mg total) by mouth 2 (two) times daily.      • ARNUITY ELLIPTA 100 MCG/ACT Inhalation Aerosol Powder, Breath Activated Inhale 1 puff into the lungs daily.      • furosemide 40 MG Oral Tab Take 1 tablet (40 mg total) by mouth daily as needed.      • rosuvastatin 5 MG Oral Tab Take 1 tablet (5 mg total) by mouth  nightly.      • LEVOTHYROXINE 200 MCG Oral Tab TAKE 1 TABLET(200 MCG) BY MOUTH EVERY MORNING BEFORE BREAKFAST 90 tablet 3    • tamsulosin (FLOMAX) cap TAKE 1 CAPSULE(0.4 MG) BY MOUTH DAILY 90 capsule 0    • traMADol 50 MG Oral Tab Take 1 tablet (50 mg total) by mouth every 6 (six) hours as needed. 24 tablet 0    • aspirin 81 MG Oral Chew Tab Chew 1 tablet (81 mg total) by mouth daily.      • Enalapril Maleate 2.5 MG Oral Tab Take 1 tablet (2.5 mg total) by mouth once daily. 90 tablet 3    • Albuterol Sulfate HFA (PROAIR HFA) 108 (90 Base) MCG/ACT Inhalation Aero Soln Inhale 2 puffs into the lungs every 6 (six) hours as needed for Wheezing. 1 Inhaler 3    • Glucosamine 750 MG Oral Tab Take 1 tablet by mouth daily.      • Multiple Vitamins-Minerals (MULTI-VITAMIN/MINERALS) Oral Tab Take 1 tablet by mouth daily.      • Vitamins-Lipotropics (LIPOFLAVONOID OR) Take 1 tablet by mouth daily.      • acetaminophen 500 MG Oral Tab Take 1 tablet (500 mg total) by mouth every 6 (six) hours as needed for Pain.      • Cetirizine HCl (ZYRTEC ALLERGY) 10 MG Oral Cap Take 1 tablet by mouth daily.      • Meclizine HCl 25 MG Oral Tab Take 1 tablet (25 mg total) by mouth 3 (three) times daily as needed. 30 tablet 3        Allergies: Bactrim ds, Cephalexin, Coconut oil, Penicillins, Dust, Pollen, Adhesive tape, Iv dye [radiology contrast iodinated dyes], and Sulfa antibiotics      Anesthesia Evaluation    Patient summary reviewed.    Anesthetic Complications  (-) history of anesthetic complications         GI/Hepatic/Renal    Negative GI/hepatic/renal ROS.                             Cardiovascular  Comment: Summary:     1. Left ventricle: The cavity size is normal. There is moderate concentric      hypertrophy. Systolic function is normal. The estimated ejection fraction      is 55-60%. Wall motion is normal; there are no regional wall motion      abnormalities. There is diastolic dysfunction. Very suboptimal      subendocardial  definition precludes assessment of regional wall motion   2. Mitral valve: There is mild regurgitation.   3. Left atrium: The atrium is mildly dilated.   4. Right ventricle: Estimation of the right ventricular systolic pressure is      moderately increased. RV systolic pressure (S, est): 60mm Hg.   5. Tricuspid valve: There is mild regurgitation.   6. Pericardium, extracardiac: There is no significant pericardial effusion.       ECG reviewed.  Exercise tolerance: good     MET: >4    (+) obesity  (+) hypertension                  (+) dysrhythmias   (+) CHF                Endo/Other      (+) diabetes     (+) hypothyroidism                       Pulmonary      (+) asthma              (+) sleep apnea       Neuro/Psych                                  Past Surgical History:   Procedure Laterality Date   • Breast biopsy  1966    Dr. Hogan   • Cholecystectomy     • Colectomy  11/03/2021     laparoscopic right hemicolectomy, lysis of adhesions, possible open    • Colonoscopy N/A 9/30/2021    Procedure: COLONOSCOPY, with polypectomy;  Surgeon: Polo Salas MD;  Location: [facility]   • Ndsc ablation & rcnstj [facility] w/o bypass     • Other surgical history  05/21/2020    [facility]- Dr. Isaac     • Other surgical history  04/20/2020    [facility]- Dr. Bernard    • Removal gallbladder       Social History     Socioeconomic History   • Marital status: Life Partner   Tobacco Use   • Smoking status: Never   • Smokeless tobacco: Never   Vaping Use   • Vaping status: Never Used   Substance and Sexual Activity   • Alcohol use: Yes     Comment: weekends   • Drug use: Never     History   Drug Use Unknown     Available pre-op labs reviewed.               Airway      Mallampati: II  Mouth opening: >3 FB  TM distance: > 6 cm  Neck ROM: full Cardiovascular    Cardiovascular exam normal.  Rhythm: regular  Rate: normal     Dental             Pulmonary    Pulmonary exam normal.  Breath sounds clear to auscultation  bilaterally.               Other findings        ASA: 3   Plan: general  NPO status verified and patient meets guidelines.    Post-procedure pain management plan discussed with surgeon and patient.      Plan/risks discussed with: patient            Present on Admission:  **None**

## 2024-05-30 NOTE — CONSULTS
[facility]   part of [facility]    History and Physical     Ravindra Butler Jr. Patient Status:  Inpatient    1955 MRN [id number]   Location [facility] 3SW-A Attending Yadira Carbone MD   Hosp Day # 0 PCP Vladimir Kaufman MD     Chief Complaint: Lumbar disc disease    History of Present Illness: Ravindra Butler Jr. is a 69 year old male with history of atrial flutter, arthritis, asthma, hypertension, hyperlipidemia, hypothyroidism, colon cancer, prostate cancer, obstructive sleep apnea, type 2 diabetes presenting with lumbar disc disease status post left lumbar 4-lumbar 5 prone lateral interbody fusion, posterior spinal fusion with instrumentation, laminectomy, anterior instrumentation. Patient denies any preoperative positive review of systems. Patient with no acute complaints currently. Patient pain controlled.     Past Medical History:  Past Medical History:    Arrhythmia    a-flutter    Arthritis, degenerative    Asthma (HCC)    Asthma, mild intermittent, uncomplicated    Back problem    Disorder of thyroid    DJD (degenerative joint disease)    Essential hypertension    Essential hypertension with goal blood pressure less than 130/80    Exposure to medical diagnostic radiation    last tx Summer 2020    Extrinsic asthma, unspecified    Hearing impairment    tinnitus    High blood pressure    High cholesterol    Hypercholesteremia    Hyperpiesia    Hypothyroid    Hypothyroidism due to acquired atrophy of thyroid    Malignant neoplasm of colon (HCC)    Malignant neoplasm of prostate (HCC)    Mild intermittent asthma without complication (HCC)    Non-insulin dependent type 2 diabetes mellitus (HCC)    Obstructive apnea    DMG SPLIT NIGHT AHI 61 BIPAP 25/20    Osteoarthritis    Prediabetes    Primary osteoarthritis involving multiple joints    Sleep apnea    bipap    Thyroid activity decreased    Unspecified essential hypertension    Visual impairment    glasses        Past  Surgical History:   Past Surgical History:   Procedure Laterality Date    Breast biopsy      Dr. Hogan    Cholecystectomy      Colectomy  2021     laparoscopic right hemicolectomy, lysis of adhesions, possible open     Colonoscopy N/A 2021    Procedure: COLONOSCOPY, with polypectomy;  Surgeon: Polo Salas MD;  [facility]    Ndsc ablation & rcnstj [facility] w/o bypass      Other surgical history  2020    SPACE OAR- Dr. Isaac      Other surgical history  2020    PNBx- Dr. Bernard     Removal gallbladder         Social History:  reports that he has never smoked. He has never used smokeless tobacco. He reports current alcohol use. He reports that he does not use drugs.no illegal drugs, , 0 children, working, use a cane    Family History:   Family History   Problem Relation Age of Onset    Heart Attack Father             Cancer Father         ureteral cancer    Stroke Mother            Mother passed  Father passed   O siblings    Allergies:   Allergies   Allergen Reactions    Bactrim Ds SWELLING    Cephalexin ANAPHYLAXIS, HIVES and SWELLING     Airway closing    Coconut Oil ANAPHYLAXIS and Tightness in Throat     In food    Penicillins ANAPHYLAXIS, HIVES and SWELLING    Dust OTHER (SEE COMMENTS)    Pollen OTHER (SEE COMMENTS)    Adhesive Tape RASH    Iv Dye [Radiology Contrast Iodinated Dyes] RASH    Sulfa Antibiotics RASH     Rash on penis and diarrhea        Medications:    No current facility-administered medications on file prior to encounter.     Current Outpatient Medications on File Prior to Encounter   Medication Sig Dispense Refill    Doxycycline Hyclate 100 MG Oral Tab Take 1 tablet (100 mg total) by mouth 2 (two) times daily.      metFORMIN  MG Oral Tablet 24 Hr Take 1 tablet (500 mg total) by mouth daily with breakfast.      triamcinolone 0.1 % External Ointment Apply thinly to distal legs for itching relief. Use for 2-3  days only.      methocarbamol 750 MG Oral Tab Take 1 tablet (750 mg total) by mouth 3 (three) times daily.      oxyCODONE-acetaminophen  MG Oral Tab Take 1-2 tablets by mouth every 4 to 6 hours.      ibuprofen (ADVIL) 200 MG Oral Tab Take 2 tablets (400 mg total) by mouth every 6 (six) hours as needed for Pain.      dilTIAZem HCl ER Beads 240 MG Oral Capsule SR 24 Hr Take 1 capsule (240 mg total) by mouth 2 (two) times daily.      ARNUITY ELLIPTA 100 MCG/ACT Inhalation Aerosol Powder, Breath Activated Inhale 1 puff into the lungs daily.      furosemide 40 MG Oral Tab Take 1 tablet (40 mg total) by mouth daily as needed.      rosuvastatin 5 MG Oral Tab Take 1 tablet (5 mg total) by mouth nightly.      LEVOTHYROXINE 200 MCG Oral Tab TAKE 1 TABLET(200 MCG) BY MOUTH EVERY MORNING BEFORE BREAKFAST 90 tablet 3    tamsulosin (FLOMAX) cap TAKE 1 CAPSULE(0.4 MG) BY MOUTH DAILY 90 capsule 0    aspirin 81 MG Oral Chew Tab Chew 1 tablet (81 mg total) by mouth daily.      Enalapril Maleate 2.5 MG Oral Tab Take 1 tablet (2.5 mg total) by mouth once daily. 90 tablet 3    Albuterol Sulfate HFA (PROAIR HFA) 108 (90 Base) MCG/ACT Inhalation Aero Soln Inhale 2 puffs into the lungs every 6 (six) hours as needed for Wheezing. 1 Inhaler 3    Glucosamine 750 MG Oral Tab Take 1 tablet by mouth daily.      Multiple Vitamins-Minerals (MULTI-VITAMIN/MINERALS) Oral Tab Take 1 tablet by mouth daily.      Vitamins-Lipotropics (LIPOFLAVONOID OR) Take 1 tablet by mouth daily.      acetaminophen 500 MG Oral Tab Take 1 tablet (500 mg total) by mouth every 6 (six) hours as needed for Pain.      Cetirizine HCl (ZYRTEC ALLERGY) 10 MG Oral Cap Take 1 tablet by mouth daily.      Meclizine HCl 25 MG Oral Tab Take 1 tablet (25 mg total) by mouth 3 (three) times daily as needed. 30 tablet 3    predniSONE 20 MG Oral Tab       cyclobenzaprine 5 MG Oral Tab Take 1 tablet (5 mg total) by mouth 3 (three) times daily as needed.      traMADol 50 MG Oral Tab  Take 1 tablet (50 mg total) by mouth every 6 (six) hours as needed. 24 tablet 0       Review of Systems:   A comprehensive 14 point review of systems was completed.    Pertinent positives and negatives noted in the HPI.    Physical Exam:    /65 (BP Location: Left arm)   Pulse 70   Temp 97.5 °F (36.4 °C) (Oral)   Resp 18   Ht 5' 9\" (1.753 m)   Wt 247 lb (112 kg)   SpO2 99%   BMI 36.48 kg/m²   General: No acute distress. Alert and oriented x 3.  HEENT: Normocephalic atraumatic. Moist mucous membranes. EOM-I  Neck: No JVD. No carotid bruits.  Respiratory: Clear to auscultation bilaterally. No wheezes. No crackles  Cardiovascular: S1, S2. Regular rate and rhythm. No murmurs  Chest and Back: No tenderness or deformity.  Abdomen: Soft, nontender, nondistended.  Positive bowel sounds. No rebound, guarding  Neurologic: No focal neurological deficits. CNII-XII grossly intact. Sensation and strength intact  Musculoskeletal: Moves all extremities.  Extremities: No edema or tendnerss  Integument: No rashes or lesions.   Psychiatric: Appropriate mood and affect.      Diagnostic Data:      Labs:  No results for input(s): \"WBC\", \"HGB\", \"MCV\", \"PLT\", \"BAND\", \"INR\" in the last 168 hours.    Invalid input(s): \"LYM#\", \"MONO#\", \"BASOS#\", \"EOSIN#\"    No results for input(s): \"GLU\", \"BUN\", \"CREATSERUM\", \"GFRAA\", \"GFRNAA\", \"CA\", \"ALB\", \"NA\", \"K\", \"CL\", \"CO2\", \"ALKPHO\", \"AST\", \"ALT\", \"BILT\", \"TP\" in the last 168 hours.    CrCl cannot be calculated (Patient's most recent lab result is older than the maximum 7 days allowed.).    No results for input(s): \"PTP\", \"INR\" in the last 168 hours.    No results for input(s): \"TROP\", \"CK\" in the last 168 hours.    Imaging: Imaging data reviewed in Epic.      ASSESSMENT / PLAN:   69 year old male with history of atrial flutter, arthritis, asthma, hypertension, hyperlipidemia, hypothyroidism, colon cancer, prostate cancer, obstructive sleep apnea, type 2 diabetes presenting with lumbar disc  disease status post left lumbar 4-lumbar 5 prone lateral interbody fusion, posterior spinal fusion with instrumentation, laminectomy, anterior instrumentation    Lumbar disc disease   -POD # 0 status post left lumbar 4-lumbar 5 prone lateral interbody fusion, posterior spinal fusion with instrumentation, laminectomy, anterior instrumentation    Atrial Flutter hx  -diltiazem  -no AC due to back surgery    BPH hx  -tamsulosin    Hypothyroidism  -levothyroxine    HTN  -sbp stable  -diltazem  -enlapril    HLD  -rosuvastatin     Asthma  -continue inhalers    Quality:  DVT Prophylaxis: scd  CODE status:   [place]:     Plan of care discussed with patient and staff    Dispo: no discharge     Fabien Perea MD  [facility] Hospitalist  [phone]

## 2024-05-30 NOTE — ANESTHESIA PROCEDURE NOTES
Airway  Date/Time: 5/30/2024 10:26 AM  Urgency: elective      General Information and Staff    Patient location during procedure: OR  Anesthesiologist: Aj Chaparro MD  Resident/CRNA: Cole Etienne CRNA  Performed: CRNA   Performed by: Cole Etienne CRNA  Authorized by: Aj Chaparro MD      Indications and Patient Condition  Indications for airway management: anesthesia  Sedation level: deep  Preoxygenated: yes  Patient position: sniffing  Mask difficulty assessment: 1 - vent by mask    Final Airway Details  Final airway type: endotracheal airway      Successful airway: ETT  Cuffed: yes   Successful intubation technique: direct laryngoscopy  Facilitating devices/methods: cricoid pressure and rapid sequence intubation  Endotracheal tube insertion site: oral  Blade: Emily  Blade size: #4  ETT size (mm): 8.0    Placement verified by: capnometry   Measured from: lips  ETT to lips (cm): 22  Number of attempts at approach: 1

## 2024-05-30 NOTE — ADDENDUM NOTE
Addendum  created 05/30/24 1437 by Cole Etienne CRNA    Intraprocedure Meds edited, Orders acknowledged in Narrator

## 2024-05-31 LAB
GLUCOSE BLD-MCNC: 187 MG/DL (ref 70–99)
HCT VFR BLD AUTO: 32.6 %
HGB BLD-MCNC: 10.8 G/DL

## 2024-05-31 PROCEDURE — 97535 SELF CARE MNGMENT TRAINING: CPT

## 2024-05-31 PROCEDURE — 94799 UNLISTED PULMONARY SVC/PX: CPT

## 2024-05-31 PROCEDURE — 85018 HEMOGLOBIN: CPT | Performed by: PHYSICIAN ASSISTANT

## 2024-05-31 PROCEDURE — 97530 THERAPEUTIC ACTIVITIES: CPT

## 2024-05-31 PROCEDURE — 85014 HEMATOCRIT: CPT | Performed by: PHYSICIAN ASSISTANT

## 2024-05-31 PROCEDURE — 97162 PT EVAL MOD COMPLEX 30 MIN: CPT

## 2024-05-31 PROCEDURE — 82962 GLUCOSE BLOOD TEST: CPT

## 2024-05-31 PROCEDURE — 97165 OT EVAL LOW COMPLEX 30 MIN: CPT

## 2024-05-31 NOTE — CM/SW NOTE
05/31/24 1400   CM/SW Referral Data   Referral Source Social Work (self-referral)   Reason for Referral Discharge planning   Informant Patient;EMR;Clinical Staff Member   Patient Info   Patient's Current Mental Status at Time of Assessment Alert;Oriented   Patient lives with Spouse/Significant other   Discharge Needs   Anticipated D/C needs To be determined;Home health care;Subacute rehab;Acute rehab       HOME SITUATION per PT eval  Type of Home: House   Home Layout: One level  Stairs to Enter : 1     Lives With: Spouse  Drives: Yes  Patient Owned Equipment: Rolling walker;Cane  Patient Regularly Uses: Glasses     Prior Level of [place] per PT eval: Pt lives with spouse in ranch style home. Pt independent with ADL and mobility. Pt has been ambulating with cane. Pt works as a  for multiple schools. Pt reports multiple falls at home due to L foot drop.        Patient is a 68 y/o man admitted s/p lumbar lami/fusion.  Received request from MD office to discuss option for HHC with patient.  Informed by PT that pt needed mod assist during session and would benefit from inpatient rehab setting.  OT eval pending.    Met with pt at bedside to discuss DC Planning as above.  Pt stated he would be agreeable with HHC services at [facility].  He is unsure if he needs to go to rehab or if his wife may be able to meet his needs at home.  He feels he has not slept well and that is why he did not do well with PT today.  Reviewed options for acute vs [facility].  Plan to see how patient does with OT.  SW to return later today to talk about options again.  / to remain available for support and/or discharge planning.     Kaelyn Jamison, [facility]  Discharge Planner  [phone]

## 2024-05-31 NOTE — OCCUPATIONAL THERAPY NOTE
OCCUPATIONAL THERAPY EVALUATION - INPATIENT     Room Number: 375/375-A  Evaluation Date: 5/31/2024  Type of Evaluation: Initial  Presenting Problem: s/p Left L4-5 fusion on 5/30    Physician Order: IP Consult to Occupational Therapy  Reason for Therapy: ADL/IADL Dysfunction and Discharge Planning    OCCUPATIONAL THERAPY ASSESSMENT   Patient is currently functioning below baseline with toileting, bathing, upper body dressing, lower body dressing, bed mobility, and transfers. Prior to admission, patient's baseline is mod I with the use of a cane.  Patient is requiring minimal assist and moderate assist as a result of the following impairments: decreased functional strength and impaired standing balance. Occupational Therapy will continue to follow for duration of hospitalization.    Patient will benefit from continued skilled OT Services to facilitate return to prior level of function as patient demonstrates high motivation with excellent tolerance to an intensive therapy program       History Related to Current Admission: Patient is a 69 year old male admitted on 5/30/2024 with Presenting Problem: s/p Left L4-5 fusion on 5/30. Co-Morbidities : HTN, Aflutter, asthma, L foot drop    Recommendations for nursing staff:   Transfers: min A with RW  Toileting location: bedside commode     EVALUATION SESSION  Patient Start of Session: Pt was found sitting in his chair.   FUNCTIONAL TRANSFER ASSESSMENT  Sit to Stand: Edge of Bed  Edge of Bed: Minimal Assist    BED MOBILITY  Sit to Supine (OT): Moderate Assist    BALANCE ASSESSMENT     FUNCTIONAL ADL ASSESSMENT  LB Dressing Seated: Maximum Assist      ACTIVITY TOLERANCE:                          O2 SATURATIONS       Cognition  WFL    Upper extremity  WFL    EDUCATION PROVIDED  Patient: Role of Occupational Therapy; Plan of Care; Discharge Recommendations; Functional Transfer Techniques; Fall Prevention; Energy Conservation; Surgical Precautions; Posture/Positioning;  Compensatory ADL Techniques; Proper Body Mechanics  Patient's Response to Education: Verbalized Understanding      Equipment used: RW, gait belt   Demonstrates functional use, Would benefit from additional trial     Therapist comments: sitting in chair>stand to RW>steps to R side with 2 episodes of buckling, pt with difficulty with advancing L LE>sitting EOB>supine with assist for B LE placement>LB dressing to doff shoes     Patient End of Session: In bed;Needs met;RN aware of session/findings;Call light within reach;All patient questions and concerns addressed;SCDs in place;Alarm set    OCCUPATIONAL PROFILE    HOME SITUATION  Type of Home: House  Home Layout: One level  Lives With: Spouse               Occupation/Status: Retired-     Drives: Yes  Patient Regularly Uses: Glasses    Prior Level of Function: Pt is typically mod I with all BADL with the use of a cane. Pt reports many falls at home.     SUBJECTIVE   \"How long do people usually stay at those rehab centers?\"     PAIN ASSESSMENT  Ratin  Location: Pt denies pain       OBJECTIVE  Precautions: Spine;Bed/chair alarm (L foot drop, L LE weak)  Fall Risk: High fall risk    WEIGHT BEARING RESTRICTION  Weight Bearing Restriction: None                ASSESSMENTS    AM-PAC ‘6-Clicks’ Inpatient Daily Activity Short Form  -   Putting on and taking off regular lower body clothing?: A Lot  -   Bathing (including washing, rinsing, drying)?: A Lot  -   Toileting, which includes using toilet, bedpan or urinal? : A Lot  -   Putting on and taking off regular upper body clothing?: A Little  -   Taking care of personal grooming such as brushing teeth?: A Little  -   Eating meals?: None    AM-PAC Score:  Score: 16  Approx Degree of Impairment: 53.32%  Standardized Score (AM-PAC Scale): 35.96    PLAN  OT Treatment Plan: Balance activities;Energy conservation/work simplification techniques;ADL training;Functional transfer training;Patient/Family  education;Patient/Family training;Endurance training;Equipment eval/education;Compensatory technique education;Continued evaluation  Rehab Potential : Good  Frequency: 5x/week  Number of Visits to Meet Established Goals: 5    ADL Goals  Patient will perform toileting with min A and AE PRN  Patient will perform LB dressing with min A and AE PRN    Functional Transfer Goals  Patient will perform bed mobility supine to sit with min A  Patient will perform bed mobility sit to supine with min A  Patient will perform toilet transfer to [facility] with min A    Additional Goals:  Patient will state spine precautions and maintain during ADL      Patient Evaluation Complexity Level:   Occupational Profile/Medical History LOW - Brief history including review of medical or therapy records    Specific performance deficits impacting engagement in ADL/IADL LOW  1 - 3 performance deficits    Client Assessment/Performance Deficits LOW - No comorbidities nor modifications of tasks    Clinical Decision Making LOW - Analysis of occupational profile, problem-focused assessments, limited treatment options    Overall Complexity LOW     OT Session Time: 30 minutes  Therapeutic Activity: 15 minutes

## 2024-05-31 NOTE — PROGRESS NOTES
S:  back pain and left thigh pain.    Inspection:  Awake alert No acute distress. No difficulty breathing     Blood pressure 155/56, pulse 77, temperature 97.9 °F (36.6 °C), temperature source Oral, resp. rate 18, height 5' 9\" (1.753 m), weight 247 lb (112 kg), SpO2 98%.    Recent Labs   Lab 05/31/24  0448   HGB 10.8*   HCT 32.6*       Lumbar/Sacral Integument: Incision/ incisions;  Dressing in place, good wound approximation no erythema or fluctuance    Strength: Strength of bilateral lower extremities:     Left Right    EHL 5/5 5/5    DF 3/5 5/5    PF 5/5 5/5    Quads 5/5 5/5    IP 4+/5 5/5  Sensation: No sensory deficits noted on bilateral lower extremities      HEAD/NECK: Head is normocephalic  EYES: EOMI, RADHA  SKIN EXAM: Skin is intact, head, neck, trunk and arms/legs. No rashes, mottling or ulcerations.  LYMPH EXAM: There is no lymph edema in either lower extremity.  VASCULAR EXAM:  Good distal perfusion. No clubbing or cyanosis.Calves supple, NT bilaterally  ABDOMINAL EXAM: Abdomen is soft, NT.      A/P: POD # 1 s/p LEFT LUMBAR 4 - LUMBAR 5 PRONE LATERAL INTERBODY FUSION, POSTERIOR SPINAL FUSION WITH INSTRUMENTATION, LAMINECTOMY, ANTERIOR INSTRUMENTATION     P: Continue pain management, Continue PT/OT, Discharge planning: ok to d/c if cleared by other services , Continue medical management

## 2024-05-31 NOTE — RESPIRATORY THERAPY NOTE
AVTAR ( CPAP/BIPAP) DAILY USAGE SUMMARY    TOTAL HOURS USED  1  HOURS AND  18  MINUTES.     Addended by: CAITLYN SPRAGUE on: 8/2/2022 11:01 AM     Modules accepted: Level of Service

## 2024-05-31 NOTE — PHYSICAL THERAPY NOTE
PHYSICAL THERAPY EVALUATION - INPATIENT     Room Number: 375/375-A  Evaluation Date: 5/31/2024  Type of Evaluation: Initial  Physician Order: PT Eval and Treat    Presenting Problem: s/p L L4-5 prone lateral interbody fusion and posterior spinal fusion with instrumentation laminectomy, anterior instrumentation 5/30/24  Co-Morbidities : HTN, Aflutter, asthma, L foot drop  Reason for Therapy: Mobility Dysfunction and Discharge Planning    PHYSICAL THERAPY ASSESSMENT   Patient is currently functioning below baseline with bed mobility, transfers, and gait.  Prior to admission, patient's baseline is MOD I.  Patient is requiring moderate assist as a result of the following impairments: decreased functional strength, pain, impaired standing balance, impaired motor planning, decreased muscular endurance, difficulty maintaining precautions, and limited L foot ROM.  Physical Therapy will continue to follow for duration of hospitalization.    Patient will benefit from continued skilled PT Services to facilitate return to prior level of function as patient demonstrates high motivation with excellent tolerance to an intensive therapy program .    PLAN  PT Treatment Plan: Bed mobility;Endurance;Energy conservation;Patient education;Gait training;Strengthening;Balance training;Transfer training  Rehab Potential : Good  Frequency (Obs): 5x/week  Number of Visits to Meet Established Goals: 4      CURRENT GOALS    Goal #1 Patient is able to demonstrate supine - sit EOB @ level: minimum assistance     Goal #2 Patient is able to demonstrate transfers Sit to/from Stand at assistance level: minimum assistance     Goal #3 Patient is able to ambulate 25 feet with assist device: walker - rolling at assistance level: minimum assistance     Goal #4 Pt will demonstrate good understanding of spine precautions   Goal #5    Goal #6    Goal Comments: Goals established on 5/31/2024      PHYSICAL THERAPY MEDICAL/SOCIAL HISTORY  History related  to current admission: Patient is a 69 year old male admitted on 5/30/2024 from home for elective left L4-5 prone lateral interbody fusion and posterior spinal fusion with instrumentation laminectomy, anterior instrumentation.     HOME SITUATION  Type of Home: House   Home Layout: One level  Stairs to Enter : 1             Lives With: Spouse  Drives: Yes  Patient Owned Equipment: Rolling walker;Cane  Patient Regularly Uses: Glasses    Prior Level of [place]: Pt lives with spouse in ranch style home. Pt independent with ADL and mobility. Pt has been ambulating with cane. Pt works as a  for [employer]. Pt reports multiple falls at home due to L foot drop.     SUBJECTIVE  \"I'm still tired. I think I just need another day.\"       OBJECTIVE  Precautions: Spine;Bed/chair alarm (L foot drop, L LE weak)  Fall Risk: High fall risk    WEIGHT BEARING RESTRICTION  Weight Bearing Restriction: None                PAIN ASSESSMENT  Rating: 3  Location: back  Management Techniques: Activity promotion;Repositioning    COGNITION  Following Commands:  follows one step commands with increased time and follows one step commands with repetition  Motor Planning: impaired  Safety Judgement:  decreased awareness of need for assistance and decreased awareness of need for safety  Awareness of Errors:  decreased awareness of errors     RANGE OF MOTION AND STRENGTH ASSESSMENT  Upper extremity ROM and strength are within functional limits     Lower extremity ROM is within functional limits     Lower extremity strength is within functional limits except for the following:    Left Knee extension  2-/5  Left Dorsiflexion  3/5      BALANCE  Static Sitting: Fair +  Dynamic Sitting: Fair  Static Standing: Poor  Dynamic Standing: Poor    ADDITIONAL TESTS                                    ACTIVITY TOLERANCE                         O2 WALK       NEUROLOGICAL FINDINGS  Neurological Findings: Sensation           Sensation: L LE  numbness         AM-PAC '6-Clicks' INPATIENT SHORT FORM - BASIC MOBILITY  How much difficulty does the patient currently have...  Patient Difficulty: Turning over in bed (including adjusting bedclothes, sheets and blankets)?: A Lot   Patient Difficulty: Sitting down on and standing up from a chair with arms (e.g., wheelchair, bedside commode, etc.): A Lot   Patient Difficulty: Moving from lying on back to sitting on the side of the bed?: A Lot   How much help from another person does the patient currently need...   Help from Another: Moving to and from a bed to a chair (including a wheelchair)?: A Lot   Help from Another: Need to walk in hospital room?: A Lot   Help from Another: Climbing 3-5 steps with a railing?: Total       AM-PAC Score:  Raw Score: 11   Approx Degree of Impairment: 72.57%   Standardized Score (AM-PAC Scale): 33.86   CMS Modifier (G-Code): CL    FUNCTIONAL ABILITY STATUS  Gait Assessment   Functional Mobility/Gait Assessment  Gait Assistance: Moderate assistance  Distance (ft): 3, 5  Assistive Device: Rolling walker    Skilled Therapy Provided   MOD assist for trunk support with rolling and supine-sit log roll.   VC for UE placement.   MOD assist for force generation with sit-stand from bed x 2 reps.   VC for UE placement.  VC for posture in standing.   Demos L LE buckling with WB.   MOD assist for dynamic standing balance.   Assisted with using urinal in standing.   Pt ambulated along bedside with RW and MOD assist for balance.   3rd Pt able to ambulate with RW and MOD assist to bedside chair.   Ambulates with poor foot clearance L>R, retropulsion, and L knee instability.   VC for sequencing, L LE placement, posture, and RW use.   Returned to bedside chair.     Bed Mobility:  Rolling: MOD  Supine to sit: MOD   Sit to supine: NT     Transfer Mobility:  Sit to stand: MOD   Stand to sit: MOD  Gait = MOD     Therapist's Comments:  Reviewed log roll technique, spine precautions, and activity  recommendations. Recommend RW and transfers to chair/commode with nursing staff. RN aware.     Exercise/Education Provided:  Bed mobility  Energy conservation  Functional activity tolerated  Gait training  Posture  Strengthening  Transfer training    Patient End of Session: Up in chair;Needs met;Call light within reach;RN aware of session/findings;All patient questions and concerns addressed;SCDs in place;Alarm set;Discussed recommendations with /      Patient Evaluation Complexity Level:  History High - 3 or more personal factors and/or co-morbidities   Examination of body systems Moderate - addressing a total of 3 or more elements   Clinical Presentation Moderate - Evolving   Clinical Decision Making Moderate - Evolving       PT Session Time: 30 minutes  Gait Training:  minutes  Therapeutic Activity: 15 minutes  Neuromuscular Re-education:  minutes  Therapeutic Exercise:  minutes

## 2024-05-31 NOTE — PROGRESS NOTES
[facility] Hospitalist Progress Note                                                                     [facility]   part of [facility]      Ravindra Butler Jr.  4/26/1955    SUBJECTIVE: no chest pain, palpitations, shortness of breath, cough, nausea, vomiting, abdominal pain. Patient post operative pain controlled. Patient tolerating oral intake. Patient not doing well with ambulation yet.    OBJECTIVE:  Temp:  [97.4 °F (36.3 °C)-98.5 °F (36.9 °C)] 98.5 °F (36.9 °C)  Pulse:  [66-95] 73  Resp:  [13-25] 18  BP: (111-178)/(38-96) 126/60  SpO2:  [89 %-99 %] 92 %  Exam  Gen: No acute distress, alert and oriented  Pulm: Lungs clear bilaterally, normal respiratory effort, no crackles, no wheezing  CV: Heart with regular rate and rhythm, no murmur.   Abd: Abdomen soft, nontender, nondistended, bowel sounds present  MSK: no significant pitting edema or tenderness of the LE  Skin: no new rashes or lesions    Labs:   Recent Labs   Lab 05/31/24  0448   HGB 10.8*       No results for input(s): \"NA\", \"K\", \"CL\", \"CO2\", \"BUN\", \"CREATSERUM\", \"CA\", \"CAION\", \"MG\", \"PHOS\", \"GLU\" in the last 168 hours.    No results for input(s): \"ALT\", \"AST\", \"ALB\", \"AMYLASE\", \"LIPASE\", \"LDH\" in the last 168 hours.    Invalid input(s): \"ALPHOS\", \"TBIL\", \"DBIL\", \"TPROT\"    Recent Labs   Lab 05/30/24  0848 05/30/24  1527 05/30/24  2113 05/31/24  0537   PGLU 179* 261* 225* 187*       Meds:   Scheduled:    sennosides  17.2 mg Oral Nightly    docusate sodium  100 mg Oral BID    methocarbamol  750 mg Oral TID    fluticasone furoate  1 puff Inhalation Daily    cetirizine  10 mg Oral Daily    dilTIAZem HCl ER Beads  240 mg Oral BID    enalapril  2.5 mg Oral Daily    levothyroxine  200 mcg Oral Before breakfast    rosuvastatin  5 mg Oral Nightly    tamsulosin  0.4 mg Oral Daily     Continuous Infusions:    lactated ringers 20 mL/hr at 05/30/24 1019    lactated ringers 125 mL/hr at 05/30/24 1549     PRN:    sodium chloride    polyethylene glycol (PEG 3350)    magnesium hydroxide    bisacodyl    fleet enema    ondansetron    metoclopramide    diphenhydrAMINE **OR** diphenhydrAMINE    benzocaine-menthol    HYDROmorphone **OR** HYDROmorphone    oxyCODONE-acetaminophen **OR** oxyCODONE-acetaminophen    albuterol    ASSESSMENT / PLAN:   69 year old male with history of atrial flutter, arthritis, asthma, hypertension, hyperlipidemia, hypothyroidism, colon cancer, prostate cancer, obstructive sleep apnea, type 2 diabetes presenting with lumbar disc disease status post left lumbar 4-lumbar 5 prone lateral interbody fusion, posterior spinal fusion with instrumentation, laminectomy, anterior instrumentation     Lumbar disc disease   -POD # 1 status post left lumbar 4-lumbar 5 prone lateral interbody fusion, posterior spinal fusion with instrumentation, laminectomy, anterior instrumentation  -PT/OT-->monitor 1 more day for improvement   -ortho spine-->ok for dc     Atrial Flutter hx  -diltiazem  -no AC due to back surgery     BPH hx  -tamsulosin     Hypothyroidism  -levothyroxine     HTN  -sbp stable  -diltazem  -enlapril     HLD  -rosuvastatin      Asthma  -continue inhalers     Quality:  DVT Prophylaxis: scd  CODE status:   [place]:      Plan of care discussed with patient and staff     Dispo: no discharge      Fabien Perea MD  [facility] Hospitalist  [phone]

## 2024-05-31 NOTE — CM/SW NOTE
OT also recommending acute rehab at [place].  Met with pt who is agreeable with PMR consult to determine if he meets criteria for AR.  He would also like to see how he does with therapy tomorrow.  Informed pt referrals will also be sent to [facility] facilities in case PMR feels [facility] is more appropriate level of care.      PMR consulted.  Updated Debi from .  She stated remote review will be completed this weekend.  Referral sent to [facility] facilities via [facility].  PASRR completed and added to referral.  Await PMR recommendations for further DC planning.  / to remain available for support and/or discharge planning.     Kaelyn Jamison, [facility]  Discharge Planner  [phone]

## 2024-06-01 PROCEDURE — 94799 UNLISTED PULMONARY SVC/PX: CPT

## 2024-06-01 PROCEDURE — 97535 SELF CARE MNGMENT TRAINING: CPT

## 2024-06-01 PROCEDURE — 97530 THERAPEUTIC ACTIVITIES: CPT

## 2024-06-01 PROCEDURE — 97116 GAIT TRAINING THERAPY: CPT

## 2024-06-01 NOTE — PROGRESS NOTES
[facility] Hospitalist Progress Note                                                                     [facility]   part of [facility]      Ravindra Butler Jr.  4/26/1955    SUBJECTIVE: no chest pain, palpitations, shortness of breath, cough, nausea, vomiting, abdominal pain. Patient post operative pain controlled. Patient tolerating oral intake. Patient not doing well with ambulation yet.    OBJECTIVE:  Temp:  [97.6 °F (36.4 °C)-99 °F (37.2 °C)] 98.8 °F (37.1 °C)  Pulse:  [70-87] 73  Resp:  [15-22] 18  BP: (120-164)/(50-59) 164/58  SpO2:  [90 %-96 %] 91 %  Exam  Gen: No acute distress, alert and oriented  Pulm: Lungs clear bilaterally, normal respiratory effort, no crackles, no wheezing  CV: Heart with regular rate and rhythm, no murmur.   Abd: Abdomen soft, nontender, nondistended, bowel sounds present  MSK: no significant pitting edema or tenderness of the LE  Skin: no new rashes or lesions    Labs:   Recent Labs   Lab 05/31/24  0448   HGB 10.8*       No results for input(s): \"NA\", \"K\", \"CL\", \"CO2\", \"BUN\", \"CREATSERUM\", \"CA\", \"CAION\", \"MG\", \"PHOS\", \"GLU\" in the last 168 hours.    No results for input(s): \"ALT\", \"AST\", \"ALB\", \"AMYLASE\", \"LIPASE\", \"LDH\" in the last 168 hours.    Invalid input(s): \"ALPHOS\", \"TBIL\", \"DBIL\", \"TPROT\"    Recent Labs   Lab 05/30/24  0848 05/30/24  1527 05/30/24  2113 05/31/24  0537   PGLU 179* 261* 225* 187*       Meds:   Scheduled:    sennosides  17.2 mg Oral Nightly    docusate sodium  100 mg Oral BID    methocarbamol  750 mg Oral TID    fluticasone furoate  1 puff Inhalation Daily    cetirizine  10 mg Oral Daily    dilTIAZem HCl ER Beads  240 mg Oral BID    enalapril  2.5 mg Oral Daily    levothyroxine  200 mcg Oral Before breakfast    rosuvastatin  5 mg Oral Nightly    tamsulosin  0.4 mg Oral Daily     Continuous Infusions:    lactated ringers 20 mL/hr at 05/30/24 1019    lactated ringers 125 mL/hr at 05/30/24 1549     PRN:    polyethylene glycol (PEG 3350)    magnesium hydroxide    bisacodyl    fleet enema    ondansetron    metoclopramide    diphenhydrAMINE **OR** diphenhydrAMINE    benzocaine-menthol    HYDROmorphone **OR** HYDROmorphone    oxyCODONE-acetaminophen **OR** oxyCODONE-acetaminophen    albuterol    ASSESSMENT / PLAN:   69 year old male with history of atrial flutter, arthritis, asthma, hypertension, hyperlipidemia, hypothyroidism, colon cancer, prostate cancer, obstructive sleep apnea, type 2 diabetes presenting with lumbar disc disease status post left lumbar 4-lumbar 5 prone lateral interbody fusion, posterior spinal fusion with instrumentation, laminectomy, anterior instrumentation     Lumbar disc disease   -POD # 2 status post left lumbar 4-lumbar 5 prone lateral interbody fusion, posterior spinal fusion with instrumentation, laminectomy, anterior instrumentation  -PT/OT-->reeval today pending  -ortho spine-->ok for dc     Atrial Flutter hx  -diltiazem  -no AC due to back surgery     BPH hx  -tamsulosin     Hypothyroidism  -levothyroxine     HTN  -sbp stable  -diltazem  -enlapril     HLD  -rosuvastatin      Asthma  -continue inhalers     Quality:  DVT Prophylaxis: scd  CODE status:   [place]:      Plan of care discussed with patient and staff     Dispo:  discharge pending PT/OT, medically stable     Fabien Perea MD  [place] Hospitalist  [phone]

## 2024-06-01 NOTE — OCCUPATIONAL THERAPY NOTE
OCCUPATIONAL THERAPY TREATMENT NOTE - INPATIENT     Room Number: 375/375-A  Session: 1   Number of Visits to Meet Established Goals: 5    Presenting Problem: s/p Left L4-5 fusion on 5/30      ASSESSMENT   Patient demonstrates fair progress this session, goals remain in progress.    Patient continues to function below baseline with  standing ADL and mobility .   Contributing factors to remaining limitations include  pain, LLE stability, balance, endurance, use of adaptive techniques .  Next session anticipate patient to progress toileting, lower body dressing, bed mobility, and transfers.  Occupational Therapy will continue to follow patient for duration of hospitalization.    Patient continues to benefit from continued skilled OT services: to facilitate return to prior level of function as patient demonstrates high motivation with excellent tolerance to an intensive therapy program .     Pt/spouse were hoping to discharge home with spouse assist, however pt is at a high risk for falling with any standing/ambulatory activity at this time. Strongly encouraged intensive therapy due to significant decline from PLOF and need for significant physical assist. Pt in agreement at time of session.       OT Device Recommendations: TBD    History: Patient is a 69 year old male admitted on 5/30/2024 with Presenting Problem: s/p Left L4-5 fusion on 5/30. Co-Morbidities : HTN, Aflutter, asthma, L foot drop     WEIGHT BEARING RESTRICTION  Weight Bearing Restriction: None                Recommendations for nursing staff:   Transfers: max of  2 with RW and gait belt  Toileting location: bedside commode    TREATMENT SESSION:  Patient Start of Session: bedside chair  FUNCTIONAL TRANSFER ASSESSMENT  Sit to Stand: Chair  Edge of Bed: Not Tested  Chair: Moderate Assist    BED MOBILITY  Sit to Supine (OT): Not Tested    BALANCE ASSESSMENT     FUNCTIONAL ADL ASSESSMENT  LB Dressing Seated: Moderate Assist (Dons underpants to knees via  reacher SBA with increased time. Min (A) standing balance to manage over hips. Seated rest needed after task to catch breath. Socks/shoes/pants NT.)      ACTIVITY TOLERANCE: vitals wfl, seated rest needed for SOB/increased work of breathing.                         O2 SATURATIONS       EDUCATION PROVIDED  Patient: Role of Occupational Therapy; Plan of Care; Discharge Recommendations; Functional Transfer Techniques; Fall Prevention; Surgical Precautions; Compensatory ADL Techniques  Patient's Response to Education: Verbalized Understanding      Equipment used: RW, reacher  Demonstrates functional use, Would benefit from additional trial        Therapist comments: Educated pt on spine precautions and implications for ADL. LB dressing as noted. Multiple posterior LOB during session needing mod-max assist to maintain static stand. Ambulates with close chair follow and mod assist via RW. Posterior LOB come suddenly during mobility. Extended conversation RE discharge planning and safety.    Patient End of Session: Up in chair;Needs met;Call light within reach;RN aware of session/findings;All patient questions and concerns addressed;Alarm set    SUBJECTIVE  Pt states he doesn't want to  go to a nursing home.    PAIN ASSESSMENT  Rating: Unable to rate  Location: back, LLE  Management Techniques: Body mechanics;Nurse notified;Repositioning     OBJECTIVE  Precautions: Spine;Bed/chair alarm (L foot drop, L LE weak)    AM-PAC ‘6-Clicks’ Inpatient Daily Activity Short Form  -   Putting on and taking off regular lower body clothing?: A Lot  -   Bathing (including washing, rinsing, drying)?: A Lot  -   Toileting, which includes using toilet, bedpan or urinal? : A Lot  -   Putting on and taking off regular upper body clothing?: A Little  -   Taking care of personal grooming such as brushing teeth?: A Little  -   Eating meals?: None    AM-PAC Score:  Score: 16  Approx Degree of Impairment: 53.32%  Standardized Score (AM-PAC Scale):  35.96    PLAN  OT Treatment Plan: Balance activities;Energy conservation/work simplification techniques;ADL training;Functional transfer training;Patient/Family education;Patient/Family training;Endurance training;Equipment eval/education;Compensatory technique education;Continued evaluation  Rehab Potential : Good  Frequency: 5x/week    OT Goals: ongoing 6/1  ADL Goals  Patient will perform toileting with min A and AE PRN  Patient will perform LB dressing with min A and AE PRN     Functional Transfer Goals  Patient will perform bed mobility supine to sit with min A  Patient will perform bed mobility sit to supine with min A  Patient will perform toilet transfer to C with min A     Additional Goals:  Patient will state spine precautions and maintain during ADL    OT Session Time: 40 minutes  Self-Care Home Management: 22 minutes  Therapeutic Activity: 15 minutes  Neuromuscular Re-education:  minutes  Therapeutic Exercise:  minutes  Cognitive Skills:  minutes  Sensory Integrative:  minutes  Orthotic Management and Training:  minutes  Can add/delete any of these

## 2024-06-01 NOTE — PROGRESS NOTES
[facility] - Progress Note    Name: Ravindra Butler Jr.   / AGE: 1955 / 69 year old  MRN: [id number]   CSN: [id number]  Date of Admission: 2024     Subjective     POD # 2 L4-L5 XLIF/PSIF and laminectomy.  He is doing well overall, but notes left hip flexor weakness related to lateral approach as well his his baseline left foot drop.  This is causing difficulty with ambulation.  We discussed acute inpatient rehabilitation versus home health with home therapy.  He is adamant and prefers going home.  He is cleared to discharge from a spine standpoint once he has been cleared by the other services.       Objective   Vitals:    24 1129   BP: 117/40   Pulse: 56   Resp: 18   Temp: 99 °F (37.2 °C)      Body mass index is 36.48 kg/m².    Orthopedic Physical Exam    CMS intact, BCR, distal pulses 1+, positive DF/PF/EHL.  Motor strength 5/5 L2-S1, negative clonus, negative babinski, negative SLR.   Dressing clean, dry and intact.   Compartments/calves soft and non tender, negative amber's BLE.    Labs/Studies  No results found for this or any previous visit (from the past 24 hour(s)).       _______  Assessment       Spinal stenosis  Lumbar radiculopathy  Left foot drop       Plan              1) Continue present management  2) Pain - PO/IV  3) DVT PPX - MAGGIE's/SCD's  4) Up as tolerated with assist  5) PT/OT  6) D/C - Home with home health and home therapy  7) follow-up with Dr. Gupta's PA as scheduled

## 2024-06-01 NOTE — PHYSICAL THERAPY NOTE
PHYSICAL THERAPY TREATMENT NOTE - INPATIENT    Room Number: 375/375-A     Session: 1     Number of Visits to Meet Established Goals: 4    Presenting Problem: s/p L L4-5 prone lateral interbody fusion and posterior spinal fusion with instrumentation laminectomy, anterior instrumentation 5/30/24  Co-Morbidities : HTN, Aflutter, asthma, L foot drop    ASSESSMENT   Patient demonstrates limited progress this session, goals  remain in progress.    Patient continues to function below baseline with bed mobility, transfers, and gait.  Contributing factors to remaining limitations include decreased functional strength, decreased endurance/aerobic capacity, impaired standing balance, and decreased muscular endurance.  Next session anticipate patient to progress bed mobility, transfers, and gait.  Physical Therapy will continue to follow patient for duration of hospitalization.    Patient continues to benefit from continued skilled PT services: to facilitate return to prior level of function as patient demonstrates high motivation with excellent tolerance to an intensive therapy program .    PLAN  PT Treatment Plan: Bed mobility;Endurance;Energy conservation;Patient education;Gait training;Strengthening;Balance training;Transfer training  Rehab Potential : Good  Frequency (Obs): 5x/week    CURRENT GOALS     Goal #1 Patient is able to demonstrate supine - sit EOB @ level: minimum assistance      Goal #2 Patient is able to demonstrate transfers Sit to/from Stand at assistance level: minimum assistance      Goal #3 Patient is able to ambulate 25 feet with assist device: walker - rolling at assistance level: minimum assistance      Goal #4 Pt will demonstrate good understanding of spine precautions   Goal #5     Goal #6     Goal Comments: Goals established on 5/31/2024 6/1/2024 all goals ongoing    SUBJECTIVE  \"I feel better today than yesterday.\"    OBJECTIVE  Precautions: Spine;Bed/chair alarm (L foot drop, L LE  weak)    WEIGHT BEARING RESTRICTION  Weight Bearing Restriction: None                PAIN ASSESSMENT   Rating: 3  Location: back  Management Techniques: Activity promotion    BALANCE                                                                                                                       Static Sitting: Fair +  Dynamic Sitting: Fair           Static Standing: Poor  Dynamic Standing: Poor    ACTIVITY TOLERANCE                         O2 WALK         AM-PAC '6-Clicks' INPATIENT SHORT FORM - BASIC MOBILITY  How much difficulty does the patient currently have...  Patient Difficulty: Turning over in bed (including adjusting bedclothes, sheets and blankets)?: A Lot   Patient Difficulty: Sitting down on and standing up from a chair with arms (e.g., wheelchair, bedside commode, etc.): A Lot   Patient Difficulty: Moving from lying on back to sitting on the side of the bed?: A Lot   How much help from another person does the patient currently need...   Help from Another: Moving to and from a bed to a chair (including a wheelchair)?: A Lot   Help from Another: Need to walk in hospital room?: A Lot   Help from Another: Climbing 3-5 steps with a railing?: Total       AM-PAC Score:  Raw Score: 11   Approx Degree of Impairment: 72.57%   Standardized Score (AM-PAC Scale): 33.86   CMS Modifier (G-Code): CL    FUNCTIONAL ABILITY STATUS  Gait Assessment   Functional Mobility/Gait Assessment  Gait Assistance: Moderate assistance  Distance (ft): 5, 10  Assistive Device: Rolling walker (close chair follow)  Pattern: Comment;Shuffle (unsteady, LOB posteriorly)    Skilled Therapy Provided    Bed Mobility:  Rolling: not tested   Supine<>Sit: not tested   Sit<>Supine: not tested     Transfer Mobility:  Sit<>Stand: mod assist of one, 2nd person for safety   Stand<>Sit: mod assist   Gait: pt amb 5 and 10 feet with RW mod assist with multiple LOB posteriorly during amb with close chair follow.      Therapist's Comments: per RN pt ok  to be seen. Pt received sitting in bedside chair and agreeable to therapy. Pt denies dizziness. Pt worked on sit to stand trg from bedside chair to RW. Pt retropulsive in standing and while amb. Pt had one LOB with slow lowering into bedside chair. Pt required mod assist for sit to stand , with verbal cues for positioning prior to stand and hand placement. Pt unable to maintain static standing balance at RW without assist.  Pt reports having to use urinal. Pt requires mod assist for balance in standing while 2nd person hold urinal. Pt requires assist to don underwear.  Pt amb x 5 feet with RW, buckling noted at times with L knee. Pt wheeled into hallway in bedside chair. Pt amb x 10 feet with LOB posteriorly requires mod assist to regain balance. Pt seated in bedside chair and educated on activity recommendations, need of assist x 2 at this time, and questions answered. Pt returned to room and left sitting in bedside chair.       THERAPEUTIC EXERCISES  Lower Extremity Alternating marching  Ankle pumps  LAQ     Upper Extremity N/a     Position Sitting     Repetitions   10   Sets   1     Patient End of Session: Up in chair;Needs met;Call light within reach;RN aware of session/findings;All patient questions and concerns addressed    PT Session Time: 30 minutes  Gait Training: 15 minutes  Therapeutic Activity: 10 minutes  Therapeutic Exercise: 5 minutes

## 2024-06-02 PROCEDURE — 97116 GAIT TRAINING THERAPY: CPT

## 2024-06-02 PROCEDURE — 97530 THERAPEUTIC ACTIVITIES: CPT

## 2024-06-02 PROCEDURE — 94799 UNLISTED PULMONARY SVC/PX: CPT

## 2024-06-02 PROCEDURE — 97535 SELF CARE MNGMENT TRAINING: CPT

## 2024-06-02 NOTE — PHYSICAL THERAPY NOTE
PHYSICAL THERAPY TREATMENT NOTE - INPATIENT    Room Number: 375/375-A     Session: 2     Number of Visits to Meet Established Goals: 4    Presenting Problem: s/p L L4-5 prone lateral interbody fusion and posterior spinal fusion with instrumentation laminectomy, anterior instrumentation 5/30/24  Co-Morbidities : HTN, Aflutter, asthma, L foot drop  Reason for Therapy: Mobility Dysfunction and Discharge Planning     PHYSICAL THERAPY MEDICAL/SOCIAL HISTORY  History related to current admission: Patient is a 69 year old male admitted on 5/30/2024 from home for elective left L4-5 prone lateral interbody fusion and posterior spinal fusion with instrumentation laminectomy, anterior instrumentation.      HOME SITUATION  Type of Home: House   Home Layout: One level  Stairs to Enter : 1     Lives With: Spouse  Drives: Yes  Patient Owned Equipment: Rolling walker;Cane  Patient Regularly Uses: Glasses     Prior Level of [place]: Pt lives with spouse in ranch style home. Pt independent with ADL and mobility. Pt has been ambulating with cane. Pt works as a  for multiple schools. Pt reports multiple falls at home due to L foot drop.     ASSESSMENT   Patient demonstrates fair progress this session, goals  updated to reflect patient performance.    Patient continues to function below baseline with bed mobility, transfers, gait, stair negotiation, and standing prolonged periods.  Contributing factors to remaining limitations include decreased functional strength, decreased endurance/aerobic capacity, pain, impaired standing balance, and decreased muscular endurance.  Next session anticipate patient to progress bed mobility, transfers, gait, and standing prolonged periods.  Physical Therapy will continue to follow patient for duration of hospitalization. Goal for gait upgraded today.    Patient continues to benefit from continued skilled PT services: to facilitate return to prior level of function as patient  demonstrates high motivation with excellent tolerance to an intensive therapy program .    PLAN  PT Treatment Plan: Bed mobility;Patient education;Gait training;Range of motion;Strengthening;Transfer training  Rehab Potential : Good  Frequency (Obs): 5x/week    CURRENT GOALS   Goal #1 Patient is able to demonstrate supine - sit EOB @ level: minimum assistance      Goal #2 Patient is able to demonstrate transfers Sit to/from Stand at assistance level: minimum assistance      Goal #3 Patient is able to ambulate 25 feet with assist device: walker - rolling at assistance level: minimum assistance.  Upgraded to cga      Goal #4 Pt will demonstrate good understanding of spine precautions   Goal #5     Goal #6     Goal Comments: Goals established on 5/31/2024 6/2/2024 all goals ongoing    SUBJECTIVE  \"I want to go to rehab hopefully in [place]\"    OBJECTIVE  Precautions: Spine;Bed/chair alarm (L foot drop, L LE weak)    WEIGHT BEARING RESTRICTION  Weight Bearing Restriction: None                PAIN ASSESSMENT   Rating: 3  Location: back  Management Techniques: Activity promotion;Repositioning;Body mechanics    BALANCE                                                                                                                       Static Sitting: Fair +  Dynamic Sitting: Fair           Static Standing: Fair - (with RW)  Dynamic Standing: Fair - (with RW)    ACTIVITY TOLERANCE                         O2 WALK         AM-PAC '6-Clicks' INPATIENT SHORT FORM - BASIC MOBILITY  How much difficulty does the patient currently have...  Patient Difficulty: Turning over in bed (including adjusting bedclothes, sheets and blankets)?: A Lot   Patient Difficulty: Sitting down on and standing up from a chair with arms (e.g., wheelchair, bedside commode, etc.): A Lot   Patient Difficulty: Moving from lying on back to sitting on the side of the bed?: A Lot   How much help from another person does the patient currently need...    Help from Another: Moving to and from a bed to a chair (including a wheelchair)?: A Little   Help from Another: Need to walk in hospital room?: A Little   Help from Another: Climbing 3-5 steps with a railing?: A Lot       AM-PAC Score:  Raw Score: 14   Approx Degree of Impairment: 61.29%   Standardized Score (AM-PAC Scale): 38.1   CMS Modifier (G-Code): CL    FUNCTIONAL ABILITY STATUS  Gait Assessment   Functional Mobility/Gait Assessment  Gait Assistance: Contact guard assist  Distance (ft): 12, 10  Assistive Device: Rolling walker  Pattern:  (Decr mary lou/step length/heel-toe pattern, step-to pattern)    Skilled Therapy Provided    Bed Mobility:  Rolling: NT   Supine<>Sit: NT   Sit<>Supine: NT     Transfer Mobility:  Sit<>Stand: min assist on 1st transfer, mod assist x 2 during 2nd transfer both with RW and cues provided for safety, set-up   Stand<>Sit: cga-min assist with cues for hand placement   Gait: cga    Therapist's Comments: RN approved session, patient is sitting in the recliner chair with LE elevated.  Patient performed sit->stand to the RW min assist with  cues for technique and set-up/safety, patient initially exhibited some posterior trunk lean after getting up to stand but was able to correct placing feet more under him, patient ambulated with the RW with chair follow with cues provided for safety and sequence, reported fatigue and needs to sit down to rest, cues for safety to return to sitting.  Patient required mod assist x 2 to get up to standing position again and ambulated another 10 ft with chair follow using the RW, patient exhibits downward gaze verbalizing aloud sequence for gait.  Patient returned to sitting due to fatigue, patient was wheeled back to the room.  Patient is more steady when up with the RW today and made progress to gait tolerance.       THERAPEUTIC EXERCISES  Lower Extremity LAQ, educated on ankle DF and encouraged to do on the L foot     Upper Extremity      Position  Sitting     Repetitions   10   Sets        Patient End of Session: Up in chair;Needs met;Call light within reach;RN aware of session/findings;All patient questions and concerns addressed;Alarm set    PT Session Time: 30 minutes  Gait Training: 10 minutes  Therapeutic Activity: 8 minutes  Therapeutic Exercise: 5 minutes   Neuromuscular Re-education:  minutes

## 2024-06-02 NOTE — CONSULTS
PMR remote consult  Brief HPI:  Patient is a 69 year old with L4-5 spondylolisthesis and severe stenosis causing claudication and left foot drop with increase in falls and unsteadiness with gait. He is now s/p L4-L5 XLIF/PSIF and laminectomy. Patients post op course has been complicated  by pain weakness, post op acute blood loss anemia.   PLOF    Type of Home: House   Home Layout: One level  Stairs to Enter : 1     Lives With: Spouse  Drives: Yes  Patient Owned Equipment: Rolling walker;Cane  Patient Regularly Uses: Glasses     Prior Level of [place]: Pt lives with spouse in ranch style home. Pt independent with ADL and mobility. Pt has been ambulating with cane. Pt works as a  for multiple schools. Pt reports multiple falls at home due to L foot drop.     Gait Assessment   Functional Mobility/Gait Assessment  Gait Assistance: Moderate assistance  Distance (ft): 3, 5  Assistive Device: Rolling walker  Recommendations:  Would recommend a short stay in acute rehab for functional upgrading  ELOS 10-14 days  Patient is at a mod assist and would have goals for mod I

## 2024-06-02 NOTE — PROGRESS NOTES
[facility] Hospitalist Progress Note                                                                     [facility]   [facility]      Ravindra Butler JrCade  4/26/1955    SUBJECTIVE:   Figuring out plans for acute rehab. Pain reasonably controlled. No sob/dizziness.    OBJECTIVE:  Temp:  [98.7 °F (37.1 °C)-99.7 °F (37.6 °C)] 98.8 °F (37.1 °C)  Pulse:  [56-91] 90  Resp:  [16-18] 18  BP: (117-157)/(40-69) 152/69  SpO2:  [90 %-97 %] 95 %  Exam  Gen: No acute distress, alert and oriented  Pulm: Lungs clear bilaterally, normal respiratory effort, no crackles, no wheezing  CV: Heart with regular rate and rhythm, no murmur.   Abd: Abdomen soft, nontender, nondistended, bowel sounds present  MSK: no significant pitting edema or tenderness of the LE  Skin: no new rashes or lesions    Labs:   Recent Labs   Lab 05/31/24  0448   HGB 10.8*       No results for input(s): \"NA\", \"K\", \"CL\", \"CO2\", \"BUN\", \"CREATSERUM\", \"CA\", \"CAION\", \"MG\", \"PHOS\", \"GLU\" in the last 168 hours.    No results for input(s): \"ALT\", \"AST\", \"ALB\", \"AMYLASE\", \"LIPASE\", \"LDH\" in the last 168 hours.    Invalid input(s): \"ALPHOS\", \"TBIL\", \"DBIL\", \"TPROT\"    Recent Labs   Lab 05/30/24  0848 05/30/24  1527 05/30/24  2113 05/31/24  0537   PGLU 179* 261* 225* 187*       Meds:   Scheduled:    sennosides  17.2 mg Oral Nightly    docusate sodium  100 mg Oral BID    methocarbamol  750 mg Oral TID    fluticasone furoate  1 puff Inhalation Daily    cetirizine  10 mg Oral Daily    dilTIAZem HCl ER Beads  240 mg Oral BID    enalapril  2.5 mg Oral Daily    levothyroxine  200 mcg Oral Before breakfast    rosuvastatin  5 mg Oral Nightly    tamsulosin  0.4 mg Oral Daily     Continuous Infusions:    lactated ringers 20 mL/hr at 05/30/24 1019    lactated ringers 125 mL/hr at 05/30/24 1549     PRN:   polyethylene glycol (PEG 3350)    magnesium hydroxide    bisacodyl    fleet enema    ondansetron    metoclopramide     diphenhydrAMINE **OR** diphenhydrAMINE    benzocaine-menthol    HYDROmorphone **OR** HYDROmorphone    oxyCODONE-acetaminophen **OR** oxyCODONE-acetaminophen    albuterol    ASSESSMENT / PLAN:   69 year old male with history of aflutter, htn/hl, h/o colon/prostate ca, mia, dm presenting with lumbar disc disease status post left lumbar 4-lumbar 5 prone lateral interbody fusion, posterior spinal fusion with instrumentation, laminectomy, anterior instrumentation     Lumbar disc disease   -POD # 3 status post left lumbar 4-lumbar 5 prone lateral interbody fusion, posterior spinal fusion with instrumentation, laminectomy, anterior instrumentation  -PT/OT following, PMR saw pt. L side weaker at baseline, does have foot drop. Parks to benefit from acute rehab.      Atrial Flutter hx  -diltiazem  -no AC due to back surgery     BPH hx  -tamsulosin     Hypothyroidism  -levothyroxine     HTN  -sbp stable  -diltazem  -enlapril     HLD  -rosuvastatin      Asthma  -continue inhalers     Quality:  DVT Prophylaxis: scd  CODE status:   [place]:      Plan of care discussed with patient and staff     Dispo:  discharge pending rehab placement     Lovely Loza MD  [facility] Hospitalist  [phone]

## 2024-06-02 NOTE — CM/SW NOTE
Noted PMR recommendation for Acute Rehab stay.  Called pt in his room to discuss Rehab options.  He feels [place] is too far and requesting other options.  Referral sent via [facility] and spoke w/pt on the phone as referral started. Included facilities in his requested locations.    Will need insurance auth once choice is made    / to remain available for support and/or discharge planning.     Shantel Estrada MBA MSN, RN CTL/  [id number]

## 2024-06-02 NOTE — OCCUPATIONAL THERAPY NOTE
OCCUPATIONAL THERAPY TREATMENT NOTE - INPATIENT     Room Number: 375/375-A  Session: 2   Number of Visits to Meet Established Goals: 5    Presenting Problem: s/p Left L4-5 fusion on 5/30      ASSESSMENT   Patient demonstrates fair progress this session, goals remain in progress.    Patient continues to function below baseline with  LB/standing ADL and mobility .   Contributing factors to remaining limitations include  pain, LLE stability, balance, endurance, use of adaptive techniques .  Next session anticipate patient to progress toileting, lower body dressing, bed mobility, and transfers.  Occupational Therapy will continue to follow patient for duration of hospitalization.    Patient continues to benefit from continued skilled OT services: to facilitate return to prior level of function as patient demonstrates high motivation with excellent tolerance to an intensive therapy program .     Patient reports agreeable to intensive therapy program, but prefers one closer to his home.       OT Device Recommendations: TBD    History: Patient is a 69 year old male admitted on 5/30/2024 with Presenting Problem: s/p Left L4-5 fusion on 5/30. Co-Morbidities : HTN, Aflutter, asthma, L foot drop     WEIGHT BEARING RESTRICTION  Weight Bearing Restriction: None                Recommendations for nursing staff:   Transfers:2-person for safety with RW and gait belt  Toileting location: bedside commode    TREATMENT SESSION:  Patient Start of Session: bedside chair  FUNCTIONAL TRANSFER ASSESSMENT  Sit to Stand: Chair  Edge of Bed: Not Tested  Chair: Dependent (min first demo, mod x2 after ambulation to devine, seated rest break)    BED MOBILITY  Sit to Supine (OT): Not Tested    BALANCE ASSESSMENT     FUNCTIONAL ADL ASSESSMENT  LB Dressing Seated: Moderate Assist (donning pullups to knees min assist with reacher and increased time, min assist to don slippers fully over heels)  LB Dressing Standing: Moderate Assist (min assist plus min  for balance while donning pull-ups to waist)  Toileting Standing: Moderate Assist (min assist plus min for balance while donning pull-ups to waist)      ACTIVITY TOLERANCE: WFL, one seated rest break during mobility with RW                         O2 SATURATIONS       EDUCATION PROVIDED  Patient: Role of Occupational Therapy; Functional Transfer Techniques; Fall Prevention; Surgical Precautions; Compensatory ADL Techniques; Proper Body Mechanics  Patient's Response to Education: Verbalized Understanding; Requires Further Education      Equipment used: RW, reacher  Demonstrates functional use, Would benefit from additional trial        Therapist comments: Patient motivated and participatory; recalled 3/3 spine precautions independently with minimal cueing required to follow this session; performed LB ADL in chair with increased time; functional mobility to devine to simulate household distances with RW, CGA to min assist, and close chair follow; no noted losses of balance or posterior lean this session, however did require increased assistance for sit to stand transfer as he fatigued. Will continue to follow.     Patient End of Session: Up in chair;Needs met;Call light within reach;RN aware of session/findings;All patient questions and concerns addressed    SUBJECTIVE  \"This seems better than yesterday\"    PAIN ASSESSMENT  Rating: 3  Location: back, LLE  Management Techniques: Activity promotion;Body mechanics;Repositioning     OBJECTIVE  Precautions: Spine;Bed/chair alarm (L foot drop, L LE weak)    AM-PAC ‘6-Clicks’ Inpatient Daily Activity Short Form  -   Putting on and taking off regular lower body clothing?: A Lot  -   Bathing (including washing, rinsing, drying)?: A Lot  -   Toileting, which includes using toilet, bedpan or urinal? : A Lot  -   Putting on and taking off regular upper body clothing?: A Little  -   Taking care of personal grooming such as brushing teeth?: A Little  -   Eating meals?: None    AM-PAC  Score:  Score: 16  Approx Degree of Impairment: 53.32%  Standardized Score (AM-PAC Scale): 35.96    PLAN  OT Treatment Plan: Balance activities;Energy conservation/work simplification techniques;ADL training;Functional transfer training;Patient/Family education;Patient/Family training;Endurance training;Equipment eval/education;Compensatory technique education;Continued evaluation  Rehab Potential : Good  Frequency: 5x/week    OT Goals: ongoing 6/2  ADL Goals  Patient will perform toileting with min A and AE PRN  Patient will perform LB dressing with min A and AE PRN     Functional Transfer Goals  Patient will perform bed mobility supine to sit with min A  Patient will perform bed mobility sit to supine with min A  Patient will perform toilet transfer to BSC with min A     Additional Goals:  Patient will state spine precautions and maintain during ADL    OT Session Time: 25 minutes  Self-Care Home Management: 10 minutes  Therapeutic Activity: 15 minutes

## 2024-06-02 NOTE — CM/SW NOTE
06/02/24 1515   Choice of Post-Acute Provider   Informed patient of right to choose their preferred provider Yes   List of appropriate post-acute services provided to patient/family with quality data Yes   Patient/family choice [facility]     Called pt and his wife in his room and notified them of available providers and they chose [facility].  Reserved in [facility] and requested auth.    / to remain available for support and/or discharge planning.     Shantel ALBAA MSN, RN CTL/  [id number]

## 2024-06-02 NOTE — PROGRESS NOTES
[facility] - Progress Note    Name: Ravindra Butler Jr.   / AGE: 1955 / 69 year old  MRN: [id number]   CSN: [id number]  Date of Admission: 2024     Subjective     POD # 3 L4-L5 XLIF/PSIF and laminectomy.  He is doing well overall, but notes left hip flexor weakness related to lateral approach as well his his baseline left foot drop.  This is causing difficulty with ambulation.  We discussed acute inpatient rehabilitation versus home health with home therapy.  I spoke to Ravindra and his wife yesterday at length regarding inpatient rehabilitation, they are amenable to this we are pending PM&R consultation.     Objective   Vitals:    24 0436   BP: 151/65   Pulse: 82   Resp: 16   Temp: 99.2 °F (37.3 °C)         Recent Results (from the past 72 hour(s))   POCT Glucose    Collection Time: 24  8:48 AM   Result Value Ref Range    POC Glucose 179 (H) 70 - 99 mg/dL   POCT Glucose    Collection Time: 24  3:27 PM   Result Value Ref Range    POC Glucose 261 (H) 70 - 99 mg/dL   POCT Glucose    Collection Time: 24  9:13 PM   Result Value Ref Range    POC Glucose 225 (H) 70 - 99 mg/dL   RAINBOW DRAW LIGHT GREEN    Collection Time: 24  4:00 AM   Result Value Ref Range    Hold Lt Green Auto Resulted    Hemoglobin & Hematocrit    Collection Time: 24  4:48 AM   Result Value Ref Range    HGB 10.8 (L) 13.0 - 17.5 g/dL    HCT 32.6 (L) 39.0 - 53.0 %   POCT Glucose    Collection Time: 24  5:37 AM   Result Value Ref Range    POC Glucose 187 (H) 70 - 99 mg/dL        Body mass index is 36.48 kg/m².      Physical Exam    CMS intact, BCR, distal pulses 1+, positive DF/PF/EHL.  Motor strength 5/5 L2-S1, except for 4-/5 left hip flexion, negative clonus, negative babinski, negative SLR.   Dressing clean, dry and intact.   Compartments/calves soft and non tender, negative amber's BLE.    Labs/Studies  No results found for this or any previous visit (from the past 24 hour(s)).        _______  Assessment    Spinal stenosis  Lumbar radiculopathy  Left foot drop          Plan              1) Continue present management  2) Pain - PO/IV  3) DVT PPX - MAGGIE's/SCD's  4) Up as tolerated with assist  5) PT/OT  6) D/C - Home with home health and home therapy  7) follow-up with Dr. Gupta's PA as scheduled

## 2024-06-03 LAB
ANION GAP SERPL CALC-SCNC: 5 MMOL/L (ref 0–18)
BUN BLD-MCNC: 16 MG/DL (ref 9–23)
CALCIUM BLD-MCNC: 9.2 MG/DL (ref 8.5–10.1)
CHLORIDE SERPL-SCNC: 102 MMOL/L (ref 98–112)
CO2 SERPL-SCNC: 27 MMOL/L (ref 21–32)
CREAT BLD-MCNC: 0.64 MG/DL
EGFRCR SERPLBLD CKD-EPI 2021: 101 ML/MIN/1.73M2 (ref 60–?)
ERYTHROCYTE [DISTWIDTH] IN BLOOD BY AUTOMATED COUNT: 13.6 %
GLUCOSE BLD-MCNC: 216 MG/DL (ref 70–99)
HCT VFR BLD AUTO: 31.1 %
HGB BLD-MCNC: 10.8 G/DL
MCH RBC QN AUTO: 30 PG (ref 26–34)
MCHC RBC AUTO-ENTMCNC: 34.7 G/DL (ref 31–37)
MCV RBC AUTO: 86.4 FL
OSMOLALITY SERPL CALC.SUM OF ELEC: 286 MOSM/KG (ref 275–295)
PLATELET # BLD AUTO: 273 10(3)UL (ref 150–450)
POTASSIUM SERPL-SCNC: 4.6 MMOL/L (ref 3.5–5.1)
RBC # BLD AUTO: 3.6 X10(6)UL
SODIUM SERPL-SCNC: 134 MMOL/L (ref 136–145)
WBC # BLD AUTO: 6.8 X10(3) UL (ref 4–11)

## 2024-06-03 PROCEDURE — 97116 GAIT TRAINING THERAPY: CPT

## 2024-06-03 PROCEDURE — 85027 COMPLETE CBC AUTOMATED: CPT | Performed by: HOSPITALIST

## 2024-06-03 PROCEDURE — 94799 UNLISTED PULMONARY SVC/PX: CPT

## 2024-06-03 PROCEDURE — 97530 THERAPEUTIC ACTIVITIES: CPT

## 2024-06-03 PROCEDURE — 80048 BASIC METABOLIC PNL TOTAL CA: CPT | Performed by: HOSPITALIST

## 2024-06-03 RX ORDER — CYCLOBENZAPRINE HCL 10 MG
10 TABLET ORAL 3 TIMES DAILY PRN
Status: DISCONTINUED | OUTPATIENT
Start: 2024-06-03 | End: 2024-06-05

## 2024-06-03 NOTE — CM/SW NOTE
Spoke with Jose from [facility] ([phone]).  He requested updates/clinical for insurance auth.  Added to [facility] referral.  They will request auth for acute rehab.  Labs needed - RN to order.  Await insurance auth for further DC planning.  / to remain available for support and/or discharge planning.     Kaelyn Jamison, [facility]  Discharge Planner  [phone]

## 2024-06-03 NOTE — CDS QUERY
CLINICAL DOCUMENTATION CLARIFICATION FORM  Dear :   Can you please further clarify the diagnosis of acute blood loss anemia  [   ] acute blood loss anemia confirmed  [  X ] acute blood loss anemia ruled out  [   ] other, please specify      Documentation from the Medical Record:   Physical medicine consult: Patients post op course has been complicated by pain weakness, post op acute blood loss anemia.       Possible Risk Factors:    5/30/24 L4-L5 XLIF/PSIF and laminectomy     Clinical Indicators:   Prior to procedure 4/30/24 [facility] HGB 13.5  5/31/24 HGB 10.8  6/3/24 HGB 10.8    Treatment:   Repeat labs, trend hemoglobin        For questions regarding this query, please contact Clinical Documentation : Olivia Naik  [phone]  Thank you kindly!                                                                           THIS FORM IS A PERMANENT PART OF THE MEDICAL RECORD

## 2024-06-03 NOTE — PROGRESS NOTES
[facility] Hospitalist Progress Note                                                                     [facility]   part of [facility]      Ravindra Butler JrCade  4/26/1955    SUBJECTIVE:   Having left upper thigh intense discomfort today. No leg swelling.    OBJECTIVE:  Temp:  [98.5 °F (36.9 °C)-99.2 °F (37.3 °C)] 98.6 °F (37 °C)  Pulse:  [70-84] 74  Resp:  [16-20] 20  BP: (136-158)/(45-61) 158/56  SpO2:  [90 %-100 %] 94 %  Exam  Gen: No acute distress, alert and oriented  Pulm: Lungs clear bilaterally, normal respiratory effort, no crackles, no wheezing  CV: Heart with regular rate and rhythm, no murmur.   Abd: Abdomen soft, nontender, nondistended, bowel sounds present  MSK: no significant pitting edema or tenderness of the LE  Skin: no new rashes or lesions    Labs:   Recent Labs   Lab 05/31/24  0448   HGB 10.8*       No results for input(s): \"NA\", \"K\", \"CL\", \"CO2\", \"BUN\", \"CREATSERUM\", \"CA\", \"CAION\", \"MG\", \"PHOS\", \"GLU\" in the last 168 hours.    No results for input(s): \"ALT\", \"AST\", \"ALB\", \"AMYLASE\", \"LIPASE\", \"LDH\" in the last 168 hours.    Invalid input(s): \"ALPHOS\", \"TBIL\", \"DBIL\", \"TPROT\"    Recent Labs   Lab 05/30/24  0848 05/30/24  1527 05/30/24  2113 05/31/24  0537   PGLU 179* 261* 225* 187*       Meds:   Scheduled:    sennosides  17.2 mg Oral Nightly    docusate sodium  100 mg Oral BID    methocarbamol  750 mg Oral TID    fluticasone furoate  1 puff Inhalation Daily    cetirizine  10 mg Oral Daily    dilTIAZem HCl ER Beads  240 mg Oral BID    enalapril  2.5 mg Oral Daily    levothyroxine  200 mcg Oral Before breakfast    rosuvastatin  5 mg Oral Nightly    tamsulosin  0.4 mg Oral Daily     Continuous Infusions:    lactated ringers 20 mL/hr at 05/30/24 1019    lactated ringers 125 mL/hr at 05/30/24 1549     PRN:   polyethylene glycol (PEG 3350)    magnesium hydroxide    bisacodyl    fleet enema    ondansetron    metoclopramide    diphenhydrAMINE  **OR** diphenhydrAMINE    benzocaine-menthol    HYDROmorphone **OR** HYDROmorphone    oxyCODONE-acetaminophen **OR** oxyCODONE-acetaminophen    albuterol    ASSESSMENT / PLAN:   69 year old male with history of aflutter, htn/hl, h/o colon/prostate ca, mia, dm presenting with lumbar disc disease status post left lumbar 4-lumbar 5 prone lateral interbody fusion, posterior spinal fusion with instrumentation, laminectomy, anterior instrumentation     Lumbar disc disease   -status post left lumbar 4-lumbar 5 prone lateral interbody fusion, posterior spinal fusion with instrumentation, laminectomy, anterior instrumentation  -PT/OT following, PMR saw pt. L side weaker at baseline, does have foot drop. [place] to benefit from acute rehab.   -add further muscle relaxant for pain control     Atrial Flutter hx  -diltiazem  -no AC due to back surgery     BPH hx  -tamsulosin     Hypothyroidism  -levothyroxine     HTN  -sbp stable  -diltazem  -enlapril     HLD  -rosuvastatin      Asthma  -continue inhalers     Quality:  DVT Prophylaxis: scd  CODE status:   [place]:      Plan of care discussed with patient and staff     Dispo:  discharge pending rehab placement     Lovely Loza MD  [facility] Hospitalist  [phone]

## 2024-06-03 NOTE — PHYSICAL THERAPY NOTE
PHYSICAL THERAPY TREATMENT NOTE - INPATIENT    Room Number: 375/375-A     Session: 3    Number of Visits to Meet Established Goals: 4    Presenting Problem: s/p L L4-5 prone lateral interbody fusion and posterior spinal fusion with instrumentation laminectomy, anterior instrumentation 5/30/24  Co-Morbidities : HTN, Aflutter, asthma, L foot drop    PHYSICAL THERAPY MEDICAL/SOCIAL HISTORY  History related to current admission: Patient is a 69 year old male admitted on 5/30/2024 from home for elective left L4-5 prone lateral interbody fusion and posterior spinal fusion with instrumentation laminectomy, anterior instrumentation.      HOME SITUATION  Type of Home: House   Home Layout: One level  Stairs to Enter : 1     Lives With: Spouse  Drives: Yes  Patient Owned Equipment: Rolling walker;Cane  Patient Regularly Uses: Glasses     Prior Level of [place]: Pt lives with spouse in ranch style home. Pt independent with ADL and mobility. Pt has been ambulating with cane. Pt works as a  for multiple schools. Pt reports multiple falls at home due to L foot drop.     ASSESSMENT   Patient demonstrates fair progress this session, goals  remain in progress.    Patient continues to function below baseline with bed mobility, transfers, gait, maintaining seated position, and standing prolonged periods.  Contributing factors to remaining limitations include decreased functional strength, decreased endurance/aerobic capacity, pain, impaired static and dynamic sitting/standing balance, impaired coordination, impaired motor planning, and decreased muscular endurance.  Next session anticipate patient to progress bed mobility, transfers, and gait.  Physical Therapy will continue to follow patient for duration of hospitalization.    Patient continues to benefit from continued skilled PT services: to facilitate return to prior level of function as patient demonstrates high motivation with excellent tolerance to an  intensive therapy program .    PLAN  PT Treatment Plan: Bed mobility;Patient education;Gait training;Range of motion;Strengthening;Transfer training  Rehab Potential : Good  Frequency (Obs): 5x/week    CURRENT GOALS   Goal #1 Patient is able to demonstrate supine - sit EOB @ level: minimum assistance      Goal #2 Patient is able to demonstrate transfers Sit to/from Stand at assistance level: minimum assistance      Goal #3 Patient is able to ambulate 25 feet with assist device: walker - rolling at assistance level: minimum assistance.  Upgraded to cga      Goal #4 Pt will demonstrate good understanding of spine precautions   Goal #5     Goal #6     Goal Comments: Goals established on 2024  6/3/2024 all goals ongoing    SUBJECTIVE  \"One position is only comfortable for 20 minutes.\"    OBJECTIVE  Precautions: Spine;Bed/chair alarm (L foot drop, L LE weak)    WEIGHT BEARING RESTRICTION  Weight Bearing Restriction: None                PAIN ASSESSMENT   Ratin  Location: back  Management Techniques: Activity promotion;Body mechanics;Repositioning    BALANCE                                                                                                                       Static Sitting: Fair +  Dynamic Sitting: Fair           Static Standing: Fair -  Dynamic Standing: Poor +    ACTIVITY TOLERANCE                         O2 WALK         AM-PAC '6-Clicks' INPATIENT SHORT FORM - BASIC MOBILITY  How much difficulty does the patient currently have...  Patient Difficulty: Turning over in bed (including adjusting bedclothes, sheets and blankets)?: A Lot   Patient Difficulty: Sitting down on and standing up from a chair with arms (e.g., wheelchair, bedside commode, etc.): A Little   Patient Difficulty: Moving from lying on back to sitting on the side of the bed?: A Lot   How much help from another person does the patient currently need...   Help from Another: Moving to and from a bed to a chair (including a  wheelchair)?: A Little   Help from Another: Need to walk in hospital room?: A Little   Help from Another: Climbing 3-5 steps with a railing?: A Lot       AM-PAC Score:  Raw Score: 15   Approx Degree of Impairment: 57.7%   Standardized Score (AM-PAC Scale): 39.45   CMS Modifier (G-Code): CK    FUNCTIONAL ABILITY STATUS  Gait Assessment   Functional Mobility/Gait Assessment  Gait Assistance: Minimum assistance;Contact guard assist  Distance (ft): 12  Assistive Device: Rolling walker  Pattern: L Foot drop    Skilled Therapy Provided    Bed Mobility:  Rolling: NT   Supine<>Sit: modA-    Completed modified log roll to the left at Gabriel> L sidelying to sitting EOB at modA (assist both at BLE and at trunk)  Sit<>Supine: maxA     Again completed modified w/ HOB elevated -     Transfer Mobility:  Sit<>Stand:    With height of bed elevated- modA to RW- v/c for hand placement and to pull feet unde him   Stand<>Sit: CGA- improvement in eccentric control   Gait: Gabriel with a few intermittent periods of CGA w/ RW x 12 feet, LLE foot drop, decr mary lou. Verbalizes aloud sequence for gait - close chair follow     Therapist's Comments:   Patient presents sitting up in bed. Discussed goal of today's therapy session, pt in agreement. Reports 7/10 pain; RN aware and gave meds prior to starting session. With head of bed elevated, Gbariel to complete modified log roll to left at Gabriel > L sidelying to sitting EOB at modA. Once sitting EOB, pt had incr difficulty reaching foot flat position due to posterior trunk lean. Once his feet reached flat foot he was able to maintain static sitting balance with assist from 1 bedrail. Height of bed elevated for ease of sit to stand transfer- completed at modA to RW. In standing, demonstrated small posterior lean but able to correct overtime. Continues to have difficulty pulling LLE back far enough under him without cues. Stood for about 3 minutes prior to ambulating. Ambulated 12 feet w/ RW at Gabriel w/ a  few intermittent periods of CGA; continues to demonstrate L foot drop and requires incr processing time for sequencing gait pattern. After 12 feet pt requesting to sit as left leg felt fatigued. Wheeled back to room and completed sit to stands from bedside chair- working on forward momentum and powering up through his legs. Relies heavily on BUE push from arm rests of chair to reach standing position- from bedside chair required CGA to modA for transfer. Stood for ~2 minutes between each transfer, with SBA assist for static standing. On last stand, pt ambulated w/ RW to bed. Sit to stand CGA. Sit to supine maxA. MaxA x 2 for repositioning/boosting in bed. RN aware.     Patient End of Session: In bed;Call light within reach;Needs met;RN aware of session/findings;All patient questions and concerns addressed;SCDs in place;Alarm set;Discussed recommendations with /    PT Session Time: 45 minutes  Gait Training: 15 minutes  Therapeutic Activity: 30 minutes

## 2024-06-03 NOTE — PROGRESS NOTES
[facility] - Progress Note    Name: Ravindra Butler Jr.   / AGE: 1955 /  year old  MRN: [id number]   CSN: [id number]  Date of Admission: 2024     Subjective     POD # 4 L4-L5 XLIF/PSIF and laminectomy.  Continues to have left HF weakness and baseline left foot drop. Pain in lower back and left thigh      Objective   Vitals:    24 0759   BP: 158/56   Pulse: 74   Resp: 20   Temp: 98.6 °F (37 °C)         No results found for this or any previous visit (from the past 72 hour(s)).       Body mass index is 36.48 kg/m².      Physical Exam    CMS intact, BCR, distal pulses 1+, positive DF/PF/EHL.  Motor strength 5/5 L2-S1, except for 4-/5 left hip flexion, negative clonus, negative babinski, negative SLR.   Dressing clean, dry and intact.   Compartments/calves soft and non tender, negative amber's BLE.    Labs/Studies  No results found for this or any previous visit (from the past 24 hour(s)).       _______  Assessment    Spinal stenosis  Lumbar radiculopathy  Left foot drop          Plan              1) Continue present management  2) Pain - PO/IV  3) DVT PPX - MAGGIE's/SCD's  4) Up as tolerated with assist  5) PT/OT  6) D/C - from spine standpoint   7) follow-up with my self

## 2024-06-04 PROCEDURE — 94799 UNLISTED PULMONARY SVC/PX: CPT

## 2024-06-04 NOTE — CM/SW NOTE
Uploaded updates to [facility] referral via [facility]. Awaiting pt choice, CLRC review and will need insurance auth.    / to remain available for support and/or discharge planning.     Shantel Estrada MBA MSN, RN CTL/  [phone]

## 2024-06-04 NOTE — PROGRESS NOTES
Pt A&O x 4 , on RA , TELE-NSR, BM-5/29, Pain controlled with current regimen,   up with sarasteady X2 AST, dressing-intact with small drainage, mia-cpap, scds in place, no complaints at this time.

## 2024-06-04 NOTE — CM/SW NOTE
Spoke with Jose from [facility] ([phone]) who stated insurance auth still pending. Beds available once auth received.    Received call from pt's wife Ciera.  Updated her as above.  List of accepting [facility] facilities emailed to her via [facility] in case insurance denies acute level of care.      / to remain available for support and/or discharge planning.     Kaeyln Jamison, [facility]  Discharge Planner  [phone]

## 2024-06-04 NOTE — CM/SW NOTE
Informed by Jose with [facility] that pt's insurance has indicated an intent to deny acute rehab. MD peer to peer is offered and needs to be completed by tomorrow 6/5 at 10am. MD will need to call [phone], option 5, case ID [id number].  Message sent to Salome MORGAN and MDs.      Updated pt at bedside regarding above.  Also spoke with pt's wife by phone to update her.  Encouraged them to choose back up [facility] facility from [facility] list given in case peer to peer is not successful.      Await outcome of peer to peer.  / to remain available for support and/or discharge planning.     Kaelyn Jamison, [facility]  Discharge Planner  [phone]    Addendum:  Spoke with Salome MORGAN who completed peer to peer however insurance indicating pt more appropriate for [facility].  Pt/family reviewing list to confirm preferred facility.  Will send message to [facility] to request chayo mena for [facility].  Message sent to [facility].

## 2024-06-04 NOTE — PROGRESS NOTES
[facility] - Progress Note    Name: Ravindra Butler Jr.   / AGE: 1955 / 69 year old  MRN: [id number]   CSN: [id number]  Date of Admission: 2024     Subjective     POD # 5 L4-L5 XLIF/PSIF and laminectomy.  Continues to have left HF weakness and baseline left foot drop. Pain in lower back and left thigh     Still waiting for rehab placement. No changes     Objective   Vitals:    24 0820   BP: (!) 169/63   Pulse: 72   Resp: 18   Temp: 98.8 °F (37.1 °C)         Recent Results (from the past 72 hour(s))   CBC, Platelet; No Differential    Collection Time: 24 12:27 PM   Result Value Ref Range    WBC 6.8 4.0 - 11.0 x10(3) uL    RBC 3.60 (L) 3.80 - 5.80 x10(6)uL    HGB 10.8 (L) 13.0 - 17.5 g/dL    HCT 31.1 (L) 39.0 - 53.0 %    .0 150.0 - 450.0 10(3)uL    MCV 86.4 80.0 - 100.0 fL    MCH 30.0 26.0 - 34.0 pg    MCHC 34.7 31.0 - 37.0 g/dL    RDW 13.6 %   Basic Metabolic Panel (8)    Collection Time: 24 12:27 PM   Result Value Ref Range    Glucose 216 (H) 70 - 99 mg/dL    Sodium 134 (L) 136 - 145 mmol/L    Potassium 4.6 3.5 - 5.1 mmol/L    Chloride 102 98 - 112 mmol/L    CO2 27.0 21.0 - 32.0 mmol/L    Anion Gap 5 0 - 18 mmol/L    BUN 16 9 - 23 mg/dL    Creatinine 0.64 (L) 0.70 - 1.30 mg/dL    Calcium, Total 9.2 8.5 - 10.1 mg/dL    Calculated Osmolality 286 275 - 295 mOsm/kg    eGFR-Cr 101 >=60 mL/min/1.73m2          Body mass index is 36.48 kg/m².      Physical Exam    CMS intact, BCR, distal pulses 1+, positive DF/PF/EHL.  Motor strength 5/5 L2-S1, except for 4-/5 left hip flexion, negative clonus, negative babinski, negative SLR.   Dressing clean, dry and intact.   Compartments/calves soft and non tender, negative amber's BLE.    Labs/Studies  Recent Results (from the past 24 hour(s))   CBC, Platelet; No Differential    Collection Time: 24 12:27 PM   Result Value Ref Range    WBC 6.8 4.0 - 11.0 x10(3) uL    RBC 3.60 (L) 3.80 - 5.80 x10(6)uL    HGB 10.8 (L) 13.0 - 17.5 g/dL    HCT 31.1  Immunosuppression Note:    Nicci Pemberton is a 57 year old female who is seen today  for immunosuppression management     I, Mandeep Alford MD, I have examined the patient with the resident/PA/Fellow, discussed and agree with the note and findings.  I have reviewed today's vital signs, medications, labs and imaging. I reviewed the immunosuppression medications and levels. I spoke to the patient/family and explained below clinical details and answered all the questions      Transplant Surgery  Inpatient Daily Progress Note  09/07/2019    Assessment & Plan: Nicci Pemberton is a 56 yo female with a past medical history significant for end stage liver disease 2/2 ANGULO and alpha 1 anti-trypsin deficiency now s/p DD OLT on 8/18/19.     Graft function: ALK phos, AST and ALT stable. Tbili increased to 1.4. R drain removed, L drain output decreasing, will plan to remove once consistently < 500ml/day. Output < 500 ml x 2 day.  Will remove drain Sunday if output < 500 mL today.     Immunosuppression management: Tac now stopped due to prolonged delirium, CSA started at 300 mg BID. MMF increased to 1000 mg BID. Re-started pred 5mg due to transition between meds. First cyclosporine level came back high at 455. Dose held x 1 dose and restarted 150 mg BID. 9/6 level 123, 10 hr trough. Level in progress . Goal - 200-300. Complexity of management: Medium. Contributing factors: anemia and induction , encephalopathy.     Hematology: Acute blood loss anemia. Transfusion goal hgb > 7. Hgb stable at this time. Last transfused 3 units 8/20.    HEENT: Post-op nasal bleeding due to friable mucosa, managed with packing, now resolved.     Cardiorespiratory: Patient extubated 8/24.   Hx of hypertension on spironolactone and bumex at home. SBP . HR 90s.     Neuro: Toxic and metabolic encephalopathy likely secondary to poor sleep, drugs (tac, pain medication), acute illness. Ordered melatonin and bedtime seroquel to help patient sleep,  (L) 39.0 - 53.0 %    .0 150.0 - 450.0 10(3)uL    MCV 86.4 80.0 - 100.0 fL    MCH 30.0 26.0 - 34.0 pg    MCHC 34.7 31.0 - 37.0 g/dL    RDW 13.6 %   Basic Metabolic Panel (8)    Collection Time: 06/03/24 12:27 PM   Result Value Ref Range    Glucose 216 (H) 70 - 99 mg/dL    Sodium 134 (L) 136 - 145 mmol/L    Potassium 4.6 3.5 - 5.1 mmol/L    Chloride 102 98 - 112 mmol/L    CO2 27.0 21.0 - 32.0 mmol/L    Anion Gap 5 0 - 18 mmol/L    BUN 16 9 - 23 mg/dL    Creatinine 0.64 (L) 0.70 - 1.30 mg/dL    Calcium, Total 9.2 8.5 - 10.1 mg/dL    Calculated Osmolality 286 275 - 295 mOsm/kg    eGFR-Cr 101 >=60 mL/min/1.73m2          _______  Assessment    Spinal stenosis  Lumbar radiculopathy  Left foot drop          Plan              1) Continue present management  2) Pain - PO/IV  3) DVT PPX - MAGGIE's/SCD's  4) Up as tolerated with assist  5) PT/OT  6) D/C - from spine standpoint   7) follow-up with my self            patient's mental status has seemed to be improving overall, but now seems fluctuant again. MRI head completed- results unremarkable. Not sure if current confusion is related to supratherapeutic cyclosporine. Consulted neurology, recommend LP to rule out infectious etiology. Video EEG monitoring completed, no evidence of seizures.  9/5 LP, results thus far negative for infection. WBC 1. Protein 44 and glucose 51. Negative CSF studies: cryptococcal, enterovirus, HSV, VZV.      -VBG reviewed with fellow. Avoid medications that cause respiratory depression.   -Continue scheduled melatonin and Seroquel at bedtime.   -Continue bedside sitter due to pulling at tubes.     GI/Nutrition: NAKIA. NJ in place. TF at goal. Now has bedside sitter due to confusion and pulling out NJs.     Endocrine: Steroid induced hyperglycemia, resolved  Fluid/Electrolytes: GAMAL- CRRT stopped and patient has required no further HD. Incontinent of urine, so difficult to quantify, but Cr now normalized. Cr 1.1. CO 33. UO adequate.  Drain output < 500 yesterday. Check weight. Bumex stopped yesterday. Poor PO intake due to confusion. TF and free water now restarted. More alert today. Give albumin 5% 25 grams today.    : Incontinent  Infectious disease: Ppx with micafungin x 10 days completed, zosyn completed. PPx with dapsone and valcyte.  Prophylaxis: Heparin subcutaneous TID.   Activity: OOB to chair today. PT/OT.   Disposition: 7A     Medical Decision Making: Medium  Subsequent visit 99521 (moderate level decision making)    VEENA/Fellow/Resident Provider: Keira Flood PA-C [id number]    Faculty: NATHALY Alford MD    ____________________________________________________________  Transplant History: Admitted 8/16/2019 for acute decompensated ANGULO.   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    8/18/2019 (Liver), Postoperative day: 20     Interval History:     More alert today. Oriented 1-2.  No c/o. No pain on exam.  Plan for therapy today.       ROS:   A 10-point review of systems was negative except as noted above.    Curent Meds:    aspirin  325 mg Oral Daily     carboxymethylcellulose PF  2 drop Both Eyes Q6H     clotrimazole  1 Molly Buccal 4x Daily     cycloSPORINE modified  150 mg Oral or Feeding Tube BID IS     dapsone  100 mg Oral Daily     famotidine  20 mg Oral Daily     levothyroxine  125 mcg Oral Daily     melatonin  3 mg Oral At Bedtime     multivitamins w/minerals  15 mL Per Feeding Tube Daily     mycophenolate  1,000 mg Oral BID IS    Or     mycophenolate  1,000 mg Oral or NG Tube BID IS     protein modular  1 packet Per Feeding Tube BID     QUEtiapine  25 mg Oral At Bedtime     sodium chloride  1 spray Both Nostrils Q4H     sodium chloride (PF)  3 mL Intravenous Q8H     sodium chloride (PF)  3 mL Intracatheter Q8H     valGANciclovir  450 mg Oral Daily    Or     valGANciclovir  450 mg Oral or NG Tube Daily     vitamin D3  1,000 Units Oral BID       Physical Exam:     Admit Weight: 104.3 kg (230 lb)    Current Vitals:   BP 98/66 (BP Location: Right arm)   Pulse 94   Temp 99.1  F (37.3  C) (Oral)   Resp 16   Wt 84.5 kg (186 lb 4.6 oz)   SpO2 96%   BMI 34.07 kg/m      Vital sign ranges:    Temp:  [98.2  F (36.8  C)-99.1  F (37.3  C)] 99.1  F (37.3  C)  Pulse:  [93-94] 94  Heart Rate:  [93-97] 95  Resp:  [14-20] 16  BP: ()/(61-69) 98/66  SpO2:  [89 %-100 %] 96 %  Patient Vitals for the past 24 hrs:   BP Temp Temp src Pulse Heart Rate Resp SpO2   09/07/19 0733 98/66 99.1  F (37.3  C) Oral -- 95 16 96 %   09/07/19 0403 99/69 98.5  F (36.9  C) Oral -- 94 16 94 %   09/06/19 2211 105/62 98.5  F (36.9  C) Oral -- 97 18 97 %   09/06/19 1903 105/68 98.5  F (36.9  C) Oral -- 94 18 97 %   09/06/19 1700 -- -- -- -- -- -- 95 %   09/06/19 1655 -- -- -- -- -- -- (!) 89 %   09/06/19 1452 96/61 98.6  F (37  C) Oral -- 93 20 97 %   09/06/19 1425 -- -- -- 94 -- 16 93 %   09/06/19 1420 -- -- -- 93 -- 16 93 %   09/06/19  1415 -- -- -- 94 -- 16 --   09/06/19 1410 -- -- -- -- -- 14 --   09/06/19 1405 -- -- -- -- 95 16 --   09/06/19 1400 -- -- -- -- 95 16 97 %   09/06/19 1355 -- -- -- -- 96 18 --   09/06/19 1153 101/65 98.2  F (36.8  C) -- -- 93 18 100 %     General Appearance: NAD, confused  HEENT: Feeding tube removed  Skin: normal, warm, scattered bruising  Heart: RRR  Lungs: BCTA, NLB on 2L  Abdomen: soft, nondistended, incision sutured, c/d/i. OBED x 1 with serous output  : Wearing depends  Extremities: edema: present bilaterally. 3+. Wearing lymphedema wraps.  Neurologic: Confused, oriented to name only. No focal deficit. More alert this AM      Data:   CMP  Recent Labs   Lab 09/07/19  0538 09/06/19  0550    139   POTASSIUM 4.2 4.1   CHLORIDE 99 100   CO2 33* 32   * 93   BUN 20 21   CR 1.07* 1.15*   GFRESTIMATED 57* 52*   GFRESTBLACK 66 60*   JACOB 8.2* 8.2*   MAG 2.1 1.3*   PHOS 4.1 4.4   ALBUMIN 1.7* 1.8*   BILITOTAL 1.4* 1.7*   ALKPHOS 129 134   AST 27 32   ALT 23 26     CBC  Recent Labs   Lab 09/07/19  0538 09/06/19  0550   HGB 7.0* 7.3*   WBC 2.4* 2.3*    151         COAGS  Recent Labs   Lab 09/04/19  1433   INR 1.11

## 2024-06-04 NOTE — PROGRESS NOTES
[facility] Hospitalist Progress Note                                                                     [facility]   part of [facility]      Ravindra Butler JrCade  4/26/1955    SUBJECTIVE:   Pain tolerable, flexeril has helped    OBJECTIVE:  Temp:  [97.8 °F (36.6 °C)-99.2 °F (37.3 °C)] 98.8 °F (37.1 °C)  Pulse:  [61-81] 72  Resp:  [16-20] 18  BP: (134-169)/(37-63) 169/63  SpO2:  [91 %-92 %] 91 %  Exam  Gen: No acute distress, alert and oriented  Pulm: Lungs clear bilaterally, normal respiratory effort, no crackles, no wheezing  CV: Heart with regular rate and rhythm, no murmur.   Abd: Abdomen soft, nontender, nondistended, bowel sounds present  MSK: no significant pitting edema or tenderness of the LE  Skin: no new rashes or lesions    Labs:   Recent Labs   Lab 05/31/24  0448 06/03/24  1227   WBC  --  6.8   HGB 10.8* 10.8*   MCV  --  86.4   PLT  --  273.0       Recent Labs   Lab 06/03/24  1227   *   K 4.6      CO2 27.0   BUN 16   CREATSERUM 0.64*   CA 9.2   *       No results for input(s): \"ALT\", \"AST\", \"ALB\", \"AMYLASE\", \"LIPASE\", \"LDH\" in the last 168 hours.    Invalid input(s): \"ALPHOS\", \"TBIL\", \"DBIL\", \"TPROT\"    Recent Labs   Lab 05/30/24  0848 05/30/24  1527 05/30/24  2113 05/31/24  0537   PGLU 179* 261* 225* 187*       Meds:   Scheduled:    sennosides  17.2 mg Oral Nightly    docusate sodium  100 mg Oral BID    methocarbamol  750 mg Oral TID    fluticasone furoate  1 puff Inhalation Daily    cetirizine  10 mg Oral Daily    dilTIAZem HCl ER Beads  240 mg Oral BID    enalapril  2.5 mg Oral Daily    levothyroxine  200 mcg Oral Before breakfast    rosuvastatin  5 mg Oral Nightly    tamsulosin  0.4 mg Oral Daily     Continuous Infusions:    lactated ringers 20 mL/hr at 05/30/24 1019    lactated ringers 125 mL/hr at 05/30/24 1549     PRN:   cyclobenzaprine    polyethylene glycol (PEG 3350)    magnesium hydroxide    bisacodyl    fleet enema     ondansetron    metoclopramide    diphenhydrAMINE **OR** diphenhydrAMINE    benzocaine-menthol    HYDROmorphone **OR** HYDROmorphone    oxyCODONE-acetaminophen **OR** oxyCODONE-acetaminophen    albuterol    ASSESSMENT / PLAN:   69 year old male with history of aflutter, htn/hl, h/o colon/prostate ca, mia, dm presenting with lumbar disc disease status post left lumbar 4-lumbar 5 prone lateral interbody fusion, posterior spinal fusion with instrumentation, laminectomy, anterior instrumentation     Lumbar disc disease   -status post left lumbar 4-lumbar 5 prone lateral interbody fusion, posterior spinal fusion with instrumentation, laminectomy, anterior instrumentation  -PT/OT following, PMR saw pt. L side weaker at baseline, does have foot drop. [place] to benefit from acute rehab.   -cont muscle relaxant prn     Atrial Flutter hx  -diltiazem  -no AC due to back surgery     BPH hx  -tamsulosin     Hypothyroidism  -levothyroxine     HTN  -sbp stable  -diltazem  -enlapril     HLD  -rosuvastatin      Asthma  -continue inhalers     Quality:  DVT Prophylaxis: scd  CODE status:   [place]:      Plan of care discussed with patient and staff     Dispo:  await acute rehab placement    Lovely Loza MD  \[facility] [place] [facility]\""ist  [phone]

## 2024-06-05 VITALS
SYSTOLIC BLOOD PRESSURE: 149 MMHG | WEIGHT: 247 LBS | BODY MASS INDEX: 36.58 KG/M2 | DIASTOLIC BLOOD PRESSURE: 57 MMHG | OXYGEN SATURATION: 91 % | RESPIRATION RATE: 16 BRPM | HEIGHT: 69 IN | HEART RATE: 77 BPM | TEMPERATURE: 98 F

## 2024-06-05 PROCEDURE — 94799 UNLISTED PULMONARY SVC/PX: CPT

## 2024-06-05 RX ORDER — OXYCODONE AND ACETAMINOPHEN 10; 325 MG/1; MG/1
1-2 TABLET ORAL
Qty: 30 TABLET | Refills: 0 | Status: SHIPPED | OUTPATIENT
Start: 2024-06-05

## 2024-06-05 NOTE — PROGRESS NOTES
NURSING DISCHARGE NOTE    Discharged Rehab facility via Ambulance.  Accompanied by Support staff  Belongings Taken by patient/family.    Report called to receiving RN at [facility], this RN's callback number was given for any questions, paperwork handed to ambulance service.

## 2024-06-05 NOTE — CONGREGATE LIVING REVIEW
[facility] Authorization    The [facility] Review Committee has reviewed this case and the patient IS APPROVED for discharge to a facility for Short Term Skilled once the following procedure is followed:     - The physician discharge instructions (contained within the LENA note for SNF) must inlcude the below appropriate and approved COVID instructions to the facility    For questions regarding CLRC approval process, please contact the CM assigned to the case.  For questions regarding RN discharge workflow, please contact the unit Clinical Leader.

## 2024-06-05 NOTE — CM/SW NOTE
Submitted clinical via [employer] portal.  [employer] Case/Auth ID is [id number].  Final insurance authorization is pending at this time.      SW/CM assigned to the case will continue to follow auth status.      Ely Navarro, DSC      Addendum:  6/5 -- [facility] ID [id number], approved 6/5 - 6/7 .   PAC _ [facility]    er

## 2024-06-05 NOTE — CM/SW NOTE
06/05/24 0934   Discharge disposition   Expected discharge disposition subacute   Post Acute Care Provider [facility]   Discharge transportation [facility]     1pm ambulance arranged for dc to [facility] today.  RN to call report to [phone].  Pt and LP Aliyah updated.    Arelis Youngblood LCSW  /Discharge Planner

## 2024-06-05 NOTE — PLAN OF CARE
A&Ox4. VSS. On room air. . IS encouraged. Telemetry monitoring - NSR. SCDs on BLE. Ankle pumps encouraged. Tolerating regular diet. Last BM 5/29. Voiding freely. Pain controlled. Dressing to lower back, C/D/I. Ambulating with minimal assist. Plan is to dc to acute rehab when medically clear. Patient updated on plan of care. Safety precautions in place. Call light within reach.     
A/o x4. RA/. AVTAR with CPAP. Tele:NSR. SCD's/[place]/ ankle pumps encouraged. LLE weaker per pt states is baseline from prior to surgery. Gauze and tegaderm x2 cdi. IV SL. Regular diet denies n/v. Voiding. LBM 5/29 passing flatus. Pain manged with po pain medication. POC updated with pt. All safety measures in place. Call light within reach instructed pt to call for help or assistance.   
Alert and oriented x 4, telemetry monitoring NSR, AVTAR/CPAP at night. Vitals stable on room air. Pain managed on PO medications. Dressings clean, dry, intact. Voiding without difficulty. Last BM 05/29, passing gas, milk of mag and dulcolax sup. adm. Tolerating regular diet. SCD's on bilaterally. Safety precautions in place.   
Alert and oriented x 4. Vitals stable on room air, AVTAR/CPAP at night. Telemetry monitoring per protocol, NSR.  Pain managed on PO medications.  Dressings clean, dry, intact. Voiding without difficulty. Last BM 05/29, passing gas, milk of mag given. Tolerating regular diet. SCD's on bilaterally. Safety precautions in place. Plan for discharge to acute rehab, pending insurance. Plan of care discussed with patient.   
Alert and oriented x4.  VSS.  On C-pap this evening.  , SCD's on BLE, +csm to BLE.  Pt has left food drop, which is not improved since surgery.  Tolerating diet.  Voiding per urinal.  Pain controlled with medication regimen and non-pharmalogical interventions.  Dressing to left flank and middle lower back.  Ambulating with walker and 1 assist.  Plan is to continue with pt/ot.      
Alert and oriented x4.  VSS.  On RA.  .  SCD's on BLE, Tolerating diet.  Voiding per urinal/bathroom.  Pain controlled with prn pain medication  and non-pharmalogical interventions.  Dressing to flank and lower back intact, clean and dry.  Ambulating with 1-2 perrson assist.  Plan is continue with antibiotics and to work with PT/OT.     
POD 1 L4-L5 [place].  Medications given as ordered.  Patient states pain well controlled with PRN Percocet.  Plan for PMR consult.  
Patient A/O x4, VSS on RA, c/o mod-severe pain. , tele, SCDs. Voiding freely via urinal, last BM 5/29-miralax given. Plan for dc to [facility] rehab when cleared. Safety measures in place.   
Patient alert and oriented x4. VSS on RA. Tele NSR. Pain controlled with PO PRN pain meds. Denies n/t. Gauze and tegaderm dressing to left flank and lower back, CDI. TEDs and Scds in place, ankle pumps encouraged, IS encouraged. Pt rolling side to side. Denies n/v. DTV by 2130.     Plan: PT/OT, pain control   
Patient is alert and oriented x 4. Vitals stable on room air. AVTAR with CPAP at night. Denies numbness & tingling. Hx of left foot drop. Dressing x2 c/d/I. Voiding without difficulty up moderate assist with marely steady. Last BM 6/5/24. Tolerating regular diet. Plan for BUFFY. Plan of care discussed with patient.  
Patient is alert and oriented x4. VSS on RA. Baseline neuropathy to BLE- more so on left side. Pulses bilateral +2. Appropriate strength and mobility in lower extremities.  Some complaints of pain last rated a 6/10. Incision is intact with 4x4 / tegaderm. IV is locked. Patient ambulates 2 assist w/ walker / marely-steady. Tolerates food and beverages well. Voids appropriately, last bowel movement was 6/5.  IS and ankle pumps encouraged. Belongings within reach. Encouraged to call for any needs.    
normal

## 2024-06-05 NOTE — CM/SW NOTE
Peer to peer denied for acute rehab but [facility] will approve BUFFYCade Waterman ID [id number], approved 6/5 - 6/   Met with pt and he chose [facility]- reserved in aidin- and they can accept today.    Arelis Youngblood, CLAUDEW  /Discharge Planner

## 2024-06-06 NOTE — PAYOR COMM NOTE
--------------  DISCHARGE REVIEW    Payor: [employer]  Subscriber #:  [id number]  Authorization Number: [id number]    Admit date: 5/30/24  Admit time:   8:20 AM  Discharge Date: 6/5/2024  1:59 PM     Admitting Physician: Yadira Carbone MD  Attending Physician:  No att. providers found  Primary Care Physician: Vladimir Kaufman MD    REVIEWER COMMENTS    Procedure Summary         Date: 05/30/24 Room / Location:  MAIN OR 11 /  MAIN OR     Anesthesia Start: 1019 Anesthesia Stop: 1317     Procedures:       LEFT LUMBAR 4 - LUMBAR 5 PRONE LATERAL INTERBODY FUSION, POSTERIOR SPINAL FUSION WITH INSTRUMENTATION, LAMINECTOMY, ANTERIOR INSTRUMENTATION (Spine Lumbar)      INTRAOPERATIVE NEURO MONITORING      . (Spine Lumbar) Diagnosis: (LUMBAR STENOSIS; LEFT FOOT DROP; LUMBAR SPONDYLOLITHESIS)     Surgeons: Yadira Carbone MD Anesthesiologist: Aj Chaparro MD     Anesthesia Type: general ASA Status: 3

## 2024-06-06 NOTE — OPERATIVE REPORT
Operative Report  Patient Name:  Ravindra Butler Jr.  Date: 5/30/2024  Preoperative Diagnosis: Lumbar Radiculopathy, Degenerative Disc Disease, Spondylolisthesis  Postoperative Diagnosis: Same  Primary Surgeon: Yadira Carbone MD  Assistant: Salome MORGAN  Procedures:   - L4-5 anterior interbody fusion via a lateral transpsoas approach    CPT 36564-24  - Placement of Interbody spacer     CPT 97720  - Pedicle screw instrumentation L4-5     CPT 29312  - Posterior Spinal Fusion L4-5           CPT 28407-77  - L4-5 laminectomy        CPT 35642-47  - L4-5 anterior instrumentation anterior plate    CPT 56526  - Placement of Vibone allograft     CPT 29898   - Placement of Autograft       CPT 19360  - Supervision of Flouroscopy  - Use of neuromonitoring    22 modifier was used for Lateral interbody fusion, posterior fusion, and laminectomy given the patient BMI over 35 and significantly large body habitus which made it exceptionally challenging to perform the case and required significantly increased time, energy, effort, and focus to safely perform all aspects.     Anesthesia: General Endotracheal Anesthesia  Estimated Blood Loss: 50cc  Implants: Atec IdentiTi lateral LLIF, pedicle screws x 4, rods x 2 , AMP anterior lumbar plate and screws  Bone Graft: DBM allograft autograft   Specimen: None  Condition: Stable  Complications: None      Surgical Indications:  Ravindra Butler Jr. is an 69 year old male who failed conservative treatment for the  issues listed above. The option of surgery was discussed with the patient.  Risks, benefits, and alternatives of surgery were discussed with the patient, which include but are not limited to bleeding, infection, DVT/PE, dural tears, neurologic injury, worsening of neurological status, risk of instability, need for subsequent surgery, risks associated with anesthesia, including death, which were all reviewed with the patient and she consented to proceed with  surgery.     Surgical Procedure:   After careful identification the patient patient was brought to the operating room laid supine.  Patient was induced under general anesthesia endotracheal tube was placed.  Patient was flipped prone onto the Oziel frame.  Hips and knees extended. All bony prominences were carefully padded, the back and flank was sterilely prepped and draped in standard fashion.                  Skin markings were made on the lateral and AP fluoroscopic image for the lateral incision and the posteriorly for the disk spaces. A 2 inch skin incision was made on the left. Electrocautery was used to incise through the subcutaneous fat. Obliques were dissected using blunt finger dissection. Transversalis fascia was penetrated using a tissue dilator and finger dissection was used to sweep the retroperitoneal fat and feel the psoas and transverse process of the segmental level.     Under the aid of free running EMG and radiographic visualization the tissue dilator was placed through the psoas targeted at the posterior 1/3 of the L4-5 intervertebral disc. Kwire was placed and checked on fluoro image. Sequential dilators were placed followed by the final retractor. Combination of intradiscal atilio placement and retractor maneuvering was utilized to obtain a clear corridor for discectomy. Complete discectomy was conducted using a combination of Ogden elevators, curettes and pituitary rongeurs. Trial sizers were placed and appropriate interbody fusion device selected.This was filled with graft material and tamped into position under fluroscopically guided final position.    Attention was then turned to the anterior instrumentation of L4-5 anterior plate, hemostasis was achieved an appropriate sized separate plate that is not integral to the cage was selected and applied.  Screws were placed  through the plate , and a radiograph obtained to confirm position of the graft and evaluate plate fixation       Area was  inspected for bleeding and slow removal of retractor was used to inspect the area. Lateral incision was closed using #1 vicryl for the deep subcutaneous tissue, 2-0 vicryl for subQ tissue and 3-0 monocril for the skin. Skin was dressed with dermabond, 4x4 and ioband. Attention was redirected toward posterior pedicle screws.    AP and lateral flouroscopic images were taken throughout the procedure to aid in instrumentation placement. Perfect APs werer used to justin out the lateral border of the pedicles bilaterally at the proposed treated levels. Skin incision was made 1cm lateral to this marks. Electocautery was used to dissect through the subcutaneous tissue and the fascia. Blunt finger dissection was used to develop the plane between the multifidus and remaining paraspinal muscles until the facet joint, pars and transverse process was palpated. Pedicle screws were placed using standard fluoroscopically guided pedicle screw technique.  Jamshidis' were placed at the lateral border of the pedicle and inserted to a depth of 25-30mm using AP flouro imaging. Kwires were then inserted. This was completed for all the remaining pedicles bilaterally. Lateral flouro image was taken to ensure appropriate wire trajectory. Pedicles screws of appropriate diameter and length (as assessed using pre operative imaging) were then placed at L4 and L5 and positioned confirmed with AP/Lat flouro images and EMG probing of screw heads. All screws simulated >20mAMPs. A junior elevator was inserted over the joint and bovie was used to remove the facet capsule proximal to the screw head. The facet joint of L4 -5 was burred down and bone autograft in the area was maintained and allograft was added to promote posterior facet joint fusion.    Next attention was turned to the L4-5 laminectomy and facetectomy, pedicle based retractor was placed, medial blade placed and distracted. Soft tissue was denuded off the lamina and facet with Bovie. High  speed abner was then used to initiate the laminectomy until reaching the contralateral lateral recess. Next, ligamentum flavum was released with curved curette, followed by excision of ligamentum flavum with Kerrison rongeur and pituitary.  Lateral recess was then decompressed with use of Kerrison rongeur. This completed the laminectomy. The traversing and exiting nerve roots, and the thecal sac were completely free and mobile.       22 modifier was used for Lateral interbody fusion, posterior fusion, and laminectomy given the patient BMI over 35 and significantly large body habitus which made it exceptionally challenging to perform the case and required significantly increased time, energy, effort, and focus to safely perform all aspects.     2 rods of appropriate length were selected and contoured to shape and reduced to the tulip heads. The caps were then tightened to the screw 's predetermined specification utilizing a torque . The construct was the evaluated fluoroscopically and determined to be appropriate.                   100mg of vancomycin powder was placed in the wound.   Closure was done in layers with interrupted 0 Vicryl for the fascia, 2-0 Vicryl for the subcutaneous tissue.  The subcutaneous layer was injected with 0.375% Marcaine and the skin was closed with a Monocryl suture.  Dermabond and a sterile dressing were then applied.  The patient was woken from anesthesia and transferred to the PACU in stable condition.                  A physician assistant was necessary during the case to provide positioning, exposure, hemostasis, safety and retraction and to manage wound suction so that I could operate with two hands    Yadira Carbone MD   Minimally Invasive and Complex Spine Surgery SpecialistOhioHealth [facility]

## 2024-06-06 NOTE — DISCHARGE SUMMARY
[facility]  Discharge Summary    Ravindra Butler Jr. Patient Status:  Inpatient    1955 MRN [id number]   Location [facility] 3SW-A Attending No att. providers found   Hosp Day # 6 PCP Vladimir Kaufman MD     Date of Admission: 2024    Date of Discharge: 2024    Admitting Diagnosis: LUMBAR STENOSIS; LEFT FOOT DROP; LUMBAR SPONDYLOLITHESIS  Lumbar stenosis    Discharge Diagnosis:   Patient Active Problem List   Diagnosis    Hypercholesteremia    Type 2 diabetes mellitus with microalbuminuria, without long-term current use of insulin (HCC)    Hypothyroidism due to acquired atrophy of thyroid    Mild intermittent asthma without complication (HCC)    Essential hypertension    Morbid obesity with BMI of 40.0-44.9, adult (HCC)    Typical atrial flutter (HCC) - s/p ablation without recurrence    History of prostate cancer - s/p radiation, in remission    Chronic diastolic CHF (congestive heart failure) (HCC)    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  Patient underwent   Surgical Procedures       Case IDs Date Procedure Surgeon Location Status    [id number] 24 LEFT LUMBAR 4 - LUMBAR 5 PRONE LATERAL INTERBODY FUSION, POSTERIOR SPINAL FUSION WITH INSTRUMENTATION, LAMINECTOMY, ANTERIOR INSTRUMENTATION Yadira Carbone MD  MAIN OR Comp          Afterward, pt was brought to the PACU in stable condition.  After the recovery room the patient was transferred to the floor using standard protocol orders.  Once on the floor the patient was followed by spine service and medical services as well as appropriate ancillary consultations throughout the hospital stay.  The patient participated in  Physical and Occupational Therapy making steady progressive gains.  Once deemed stable by all services the patient was discharged on the above date.  Standard discharge instructions were given and they were asked to follow up in clinic in 2 weeks.     Disposition: SNF Subacute Rehab      Discharge Medications:   Discharge Medication List as of 6/5/2024 12:57 PM        CONTINUE these medications which have CHANGED    Details   oxyCODONE-acetaminophen  MG Oral Tab Take 1-2 tablets by mouth every 4 to 6 hours., Print Script, Disp-30 tablet, R-0           CONTINUE these medications which have NOT CHANGED    Details   metFORMIN  MG Oral Tablet 24 Hr Take 1 tablet (500 mg total) by mouth daily with breakfast., Historical      predniSONE 20 MG Oral Tab Historical      triamcinolone 0.1 % External Ointment Apply thinly to distal legs for itching relief. Use for 2-3 days only., Historical      methocarbamol 750 MG Oral Tab Take 1 tablet (750 mg total) by mouth 3 (three) times daily., Historical      cyclobenzaprine 5 MG Oral Tab Take 1 tablet (5 mg total) by mouth 3 (three) times daily as needed., Historical      dilTIAZem HCl ER Beads 240 MG Oral Capsule SR 24 Hr Take 1 capsule (240 mg total) by mouth 2 (two) times daily., Historical      ARNUITY ELLIPTA 100 MCG/ACT Inhalation Aerosol Powder, Breath Activated Inhale 1 puff into the lungs daily., Historical, JHON      furosemide 40 MG Oral Tab Take 1 tablet (40 mg total) by mouth daily as needed., Historical      rosuvastatin 5 MG Oral Tab Take 1 tablet (5 mg total) by mouth nightly., Historical      LEVOTHYROXINE 200 MCG Oral Tab TAKE 1 TABLET(200 MCG) BY MOUTH EVERY MORNING BEFORE BREAKFAST, Normal, Disp-90 tablet, R-3      tamsulosin (FLOMAX) cap TAKE 1 CAPSULE(0.4 MG) BY MOUTH DAILY, Normal, Disp-90 capsule, R-0      aspirin 81 MG Oral Chew Tab Chew 1 tablet (81 mg total) by mouth daily., Historical      Enalapril Maleate 2.5 MG Oral Tab Take 1 tablet (2.5 mg  total) by mouth once daily., Normal, Disp-90 tablet, R-3      Albuterol Sulfate HFA (PROAIR HFA) 108 (90 Base) MCG/ACT Inhalation Aero Soln Inhale 2 puffs into the lungs every 6 (six) hours as needed for Wheezing., Normal, Disp-1 Inhaler, R-3      Glucosamine 750 MG Oral Tab Take 1 tablet by mouth daily., Historical      Multiple Vitamins-Minerals (MULTI-VITAMIN/MINERALS) Oral Tab Take 1 tablet by mouth daily., Historical      Vitamins-Lipotropics (LIPOFLAVONOID OR) Take 1 tablet by mouth daily., Historical      Cetirizine HCl (ZYRTEC ALLERGY) 10 MG Oral Cap Take 1 tablet by mouth daily., Historical      Meclizine HCl 25 MG Oral Tab Take 1 tablet (25 mg total) by mouth 3 (three) times daily as needed., Normal, Disp-30 tablet, R-3           STOP taking these medications       Doxycycline Hyclate 100 MG Oral Tab        ibuprofen (ADVIL) 200 MG Oral Tab        traMADol 50 MG Oral Tab        acetaminophen 500 MG Oral Tab              CATHY Castro  6/6/2024  3:07 PM

## 2024-10-17 ENCOUNTER — HOSPITAL ENCOUNTER (EMERGENCY)
Age: 69
Discharge: HOME | End: 2024-10-18
Attending: STUDENT IN AN ORGANIZED HEALTH CARE EDUCATION/TRAINING PROGRAM

## 2024-10-17 DIAGNOSIS — S01.81XA FACIAL LACERATION, INITIAL ENCOUNTER: ICD-10-CM

## 2024-10-17 DIAGNOSIS — S09.90XA CLOSED HEAD INJURY, INITIAL ENCOUNTER: ICD-10-CM
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
## 2024-10-17 PROCEDURE — 10002803 HB RX 637: Performed by: STUDENT IN AN ORGANIZED HEALTH CARE EDUCATION/TRAINING PROGRAM

## 2024-10-17 PROCEDURE — 90471 IMMUNIZATION ADMIN: CPT | Performed by: STUDENT IN AN ORGANIZED HEALTH CARE EDUCATION/TRAINING PROGRAM

## 2024-10-17 PROCEDURE — 99284 EMERGENCY DEPT VISIT MOD MDM: CPT

## 2024-10-17 PROCEDURE — 90715 TDAP VACCINE 7 YRS/> IM: CPT | Performed by: STUDENT IN AN ORGANIZED HEALTH CARE EDUCATION/TRAINING PROGRAM

## 2024-10-17 PROCEDURE — 10002800 HB RX 250 W HCPCS: Performed by: STUDENT IN AN ORGANIZED HEALTH CARE EDUCATION/TRAINING PROGRAM

## 2024-10-17 RX ORDER — HYDROCODONE BITARTRATE AND ACETAMINOPHEN 5; 325 MG/1; MG/1
1 TABLET ORAL ONCE
Status: COMPLETED | OUTPATIENT
Start: 2024-10-17 | End: 2024-10-17

## 2024-10-17 RX ADMIN — HYDROCODONE BITARTRATE AND ACETAMINOPHEN 1 TABLET: 5; 325 TABLET ORAL at 23:44

## 2024-10-17 RX ADMIN — TETANUS TOXOID, REDUCED DIPHTHERIA TOXOID AND ACELLULAR PERTUSSIS VACCINE, ADSORBED 0.5 ML: 5; 2.5; 8; 8; 2.5 SUSPENSION INTRAMUSCULAR at 23:45

## 2024-10-17 ASSESSMENT — ENCOUNTER SYMPTOMS
VOICE CHANGE: 0
WEAKNESS: 0
CHILLS: 0
COUGH: 0
VOMITING: 0
FEVER: 0
WOUND: 1
CONFUSION: 0
DIZZINESS: 0
NAUSEA: 0
BACK PAIN: 0
PHOTOPHOBIA: 0
ABDOMINAL PAIN: 0
NUMBNESS: 0
HEADACHES: 1
SHORTNESS OF BREATH: 0
NERVOUS/ANXIOUS: 0

## 2024-10-18 ENCOUNTER — APPOINTMENT (OUTPATIENT)
Dept: CT IMAGING | Age: 69
End: 2024-10-18
Attending: STUDENT IN AN ORGANIZED HEALTH CARE EDUCATION/TRAINING PROGRAM

## 2024-10-18 VITALS
HEART RATE: 84 BPM | DIASTOLIC BLOOD PRESSURE: 76 MMHG | SYSTOLIC BLOOD PRESSURE: 180 MMHG | RESPIRATION RATE: 16 BRPM | OXYGEN SATURATION: 97 % | TEMPERATURE: 98 F | WEIGHT: 260.36 LBS | BODY MASS INDEX: 39.59 KG/M2

## 2024-10-18 PROCEDURE — 72125 CT NECK SPINE W/O DYE: CPT

## 2024-10-18 PROCEDURE — 70450 CT HEAD/BRAIN W/O DYE: CPT

## 2024-10-18 PROCEDURE — 12013 RPR F/E/E/N/L/M 2.6-5.0 CM: CPT | Performed by: STUDENT IN AN ORGANIZED HEALTH CARE EDUCATION/TRAINING PROGRAM

## 2024-10-18 PROCEDURE — 10002801 HB RX 250 W/O HCPCS: Performed by: STUDENT IN AN ORGANIZED HEALTH CARE EDUCATION/TRAINING PROGRAM

## 2024-10-18 RX ORDER — LIDOCAINE HYDROCHLORIDE AND EPINEPHRINE 10; 10 MG/ML; UG/ML
1 INJECTION, SOLUTION INFILTRATION; PERINEURAL ONCE
Status: COMPLETED | OUTPATIENT
Start: 2024-10-18 | End: 2024-10-18

## 2024-10-18 RX ADMIN — LIDOCAINE HYDROCHLORIDE,EPINEPHRINE BITARTRATE 1 ML: 10; .01 INJECTION, SOLUTION INFILTRATION; PERINEURAL at 01:13

## (undated) DEVICE — ECHELON FLEX POWERED PLUS ARTICULATING ENDOSCOPIC LINEAR CUTTER , 60MM: Brand: ECHELON FLEX

## (undated) DEVICE — PTP INTRADISCAL SHIM, WIDE: Brand: LIF-PTP

## (undated) DEVICE — ENCORE® LATEX MICRO SIZE 8, STERILE LATEX POWDER-FREE SURGICAL GLOVE: Brand: ENCORE

## (undated) DEVICE — 3.0MM PRECISION NEURO (MATCH HEAD)

## (undated) DEVICE — ADHESIVE SKIN TOP FOR WND CLSR DERMBND ADV

## (undated) DEVICE — 12 ML SYRINGE LUER-LOCK TIP: Brand: MONOJECT

## (undated) DEVICE — 3M™ TEGADERM™ TRANSPARENT FILM DRESSING FRAME STYLE, 1628, 6 IN X 8 IN (15 CM X 20 CM), 10/CT 8CT/CASE: Brand: 3M™ TEGADERM™

## (undated) DEVICE — COVER,TABLE,44X90,STERILE: Brand: MEDLINE

## (undated) DEVICE — [HIGH FLOW INSUFFLATOR,  DO NOT USE IF PACKAGE IS DAMAGED,  KEEP DRY,  KEEP AWAY FROM SUNLIGHT,  PROTECT FROM HEAT AND RADIOACTIVE SOURCES.]: Brand: PNEUMOSURE

## (undated) DEVICE — TROCAR: Brand: KII FIOS FIRST ENTRY

## (undated) DEVICE — GAMMEX® PI HYBRID SIZE 6.5, STERILE POWDER-FREE SURGICAL GLOVE, POLYISOPRENE AND NEOPRENE BLEND: Brand: GAMMEX

## (undated) DEVICE — NEEDLE HPO 18GA 1.5IN ECLPS

## (undated) DEVICE — DRAPE SHEET LG

## (undated) DEVICE — COVER LT HNDL RIG FOR SUR CAM DISP

## (undated) DEVICE — MARKER SKIN PREP RESIST STRL

## (undated) DEVICE — A P RESECTION: Brand: MEDLINE INDUSTRIES, INC.

## (undated) DEVICE — SUT COAT VCRL+ 0 27IN CT-1 ABSRB VLT ANTIBACT

## (undated) DEVICE — MODULE POS LIF PTP 2.0

## (undated) DEVICE — EXCALIBUR BLADE, 20MM: Brand: INVICTUS

## (undated) DEVICE — Device

## (undated) DEVICE — SOLUTION IRRIG 1000ML 0.9% NACL USP BTL

## (undated) DEVICE — SUTURE PDS II 0 CT-1

## (undated) DEVICE — LAWSON - CATH DECA XT 6X2 5 2X110 LG

## (undated) DEVICE — E-Z CLEAN, NON-STICK, PTFE COATED, ELECTROSURGICAL BLADE ELECTRODE, MODIFIED EXTENDED INSULATION, 4 INCH (10.2 CM): Brand: MEGADYNE

## (undated) DEVICE — ZZ- DISC- NOSUB-SOLUTION RUBBING 4OZ 70% ISO ALC CLR

## (undated) DEVICE — 3M™ MICROFOAM™ TAPE 1528-4: Brand: 3M™ MICROFOAM™

## (undated) DEVICE — DRAPE SHEET LAPCHOLE 124X100X7

## (undated) DEVICE — 3M™ TEGADERM™ TRANSPARENT FILM DRESSING FRAME STYLE, 1626W, 4 IN X 4-3/4 IN (10 CM X 12 CM), 50/CT 4CT/CASE: Brand: 3M™ TEGADERM™

## (undated) DEVICE — MONITORING NEUROPHYSIOLOGICAL

## (undated) DEVICE — ENDOPATH ECHELON ENDOSCOPIC LINEAR CUTTER RELOADS, BLUE, 60MM: Brand: ECHELON ENDOPATH

## (undated) DEVICE — WRAP THERAPEUTIC BACK WO GEL P

## (undated) DEVICE — KNIFE SRG LACEY BAYONET L170MM

## (undated) DEVICE — UNDYED BRAIDED (POLYGLACTIN 910), SYNTHETIC ABSORBABLE SUTURE: Brand: COATED VICRYL

## (undated) DEVICE — LAMINECTOMY CDS: Brand: MEDLINE INDUSTRIES, INC.

## (undated) DEVICE — SLEEVE COMPR MD KNEE LEN SGL USE KENDALL SCD

## (undated) DEVICE — LIGASURE LAP MARYLAND 37CM

## (undated) DEVICE — LIF ILLUMINATION SYSTEM ENDOSC

## (undated) DEVICE — C-ARM: Brand: UNBRANDED

## (undated) DEVICE — POWER T HANDLE, STERILE: Brand: INVICTUS

## (undated) DEVICE — TROCAR: Brand: KII® SLEEVE

## (undated) DEVICE — COVER MAYO STD XL W30XL57IN STD FLX FAB

## (undated) DEVICE — 3M™ IOBAN™ 2 ANTIMICROBIAL INCISE DRAPE 6648EZ: Brand: IOBAN™ 2

## (undated) DEVICE — DISPOSABLE SUCTION/IRRIGATOR TUBE SET: Brand: AHTO

## (undated) DEVICE — CONTAINER SPEC STR 4OZ GRY LID

## (undated) DEVICE — SAFEOP INSULATED DILATOR KIT, STERILE: Brand: SAFEOP

## (undated) DEVICE — E-Z CLEAN, NON-STICK, PTFE COATED, ELECTROSURGICAL BLADE ELECTRODE, MODIFIED EXTENDED INSULATION, 6.5 INCH (16.5 CM): Brand: MEGADYNE

## (undated) DEVICE — 40580 - THE PINK PAD - ADVANCED TRENDELENBURG POSITIONING KIT: Brand: 40580 - THE PINK PAD - ADVANCED TRENDELENBURG POSITIONING KIT

## (undated) DEVICE — 20 ML SYRINGE LUER-LOCK TIP: Brand: MONOJECT

## (undated) DEVICE — SOL  .9 1000ML BTL

## (undated) DEVICE — COVER SLV UNV DISP NTR STRL LF

## (undated) DEVICE — GLOVE SUR 8.5 SENSICARE PI PIP CRM PWD F

## (undated) DEVICE — GOWN SUR 2XL LEV 4 BLU W/ WHT V NK BND AERO

## (undated) DEVICE — APPLICATOR PREP 26ML CHG 2% ISO ALC 70%

## (undated) DEVICE — VIOLET BRAIDED (POLYGLACTIN 910), SYNTHETIC ABSORBABLE SUTURE: Brand: COATED VICRYL

## (undated) DEVICE — FORCEPS BPLR LAT IF DISP

## (undated) DEVICE — SUT COAT VCRL+ 2-0 18IN CP-2 ABSRB UD ANTIBAC

## (undated) DEVICE — ENSEAL X1 TISSUE SEALER, CURVED JAW, 45 CM SHAFT LENGTH: Brand: ENSEAL

## (undated) DEVICE — SUTURE CHROMIC GUT 2-0 SH

## (undated) DEVICE — SOL  .9 3000ML

## (undated) DEVICE — ELECTRODE ES L10.2CM BLDE L4IN EXT MPLR OPN

## (undated) DEVICE — KIT HEMSTAT MTRX 8ML PORCINE GEL HUM THROM

## (undated) DEVICE — TROCAR TIP, STAINLESS STEEL GUIDEWIRE, 310MM: Brand: IDENTITI

## (undated) DEVICE — ENCORE® LATEX ACCLAIM SIZE 8, STERILE LATEX POWDER-FREE SURGICAL GLOVE: Brand: ENCORE

## (undated) DEVICE — 3M™ IOBAN™ 2 ANTIMICROBIAL INCISE DRAPE 6650EZ: Brand: IOBAN™ 2

## (undated) DEVICE — ACCESS PLATFORM FOR MINIMALLY INVASIVE SURGERY.: Brand: GELPORT® LAPAROSCOPIC  SYSTEM

## (undated) DEVICE — GAMMEX® PI HYBRID SIZE 7, STERILE POWDER-FREE SURGICAL GLOVE, POLYISOPRENE AND NEOPRENE BLEND: Brand: GAMMEX

## (undated) DEVICE — SOLUTION ENDOSCOPIC ANTI-FOG NON-TOXIC NON-ABRASIVE 6 CUBIC CENTIMETER WITH RADIOPAQUE ADHESIVE-BACKED SPONGE STERILE NOT MADE WITH NATURAL RUBBER LATEX MEDICHOICE: Brand: MEDICHOICE

## (undated) DEVICE — TROCAR TIP NITINOL GUIDEWIRE 18": Brand: INVICTUS

## (undated) DEVICE — Device: Brand: INTELLICART™

## (undated) DEVICE — ARCUS GUIDED ACCESS SYSTEM - BEVEL TIP, STERILE: Brand: ARCUS

## (undated) DEVICE — GAUZE,SPONGE,4"X4",12PLY,STERILE,LF,2'S: Brand: MEDLINE

## (undated) DEVICE — C-ARMOR C-ARM EQUIPMENT COVERS CLEAR STERILE UNIVERSAL FIT 12 PER CASE: Brand: C-ARMOR

## (undated) DEVICE — SUTURE VLOC 180 2-0 9\" 2145

## (undated) DEVICE — PROXIMATE SKIN STAPLERS (35 WIDE) CONTAINS 35 STAINLESS STEEL STAPLES (FIXED HEAD): Brand: PROXIMATE

## (undated) DEVICE — PTP ACCESS PUSHER CAP: Brand: LIF-PTP

## (undated) DEVICE — OCCLUSIVE GAUZE STRIP OVERWRAP,3% BISMUTH TRIBROMOPHENATE IN PETROLATUM BLEND: Brand: XEROFORM

## (undated) NOTE — IP AVS SNAPSHOT
Patient Demographics     Address  [address] [address] Phone  [phone] (Home)  [phone] (Mobile) *Preferred* E-mail Address  [email or web address]      Patient Contacts     Name Relation Home Work Mobile    Ciera Cline Partner [phone]      Allergies as of 6/5/2024  Review status set to In Progress on 5/30/2024       Noted Reaction Type Reactions    Bactrim Ds 10/16/2020    SWELLING    Cephalexin 08/26/2016    ANAPHYLAXIS, HIVES, SWELLING    Airway closing    Coconut Oil 04/26/2021    ANAPHYLAXIS, Tightness in Throat    In food    Penicillins 04/15/2014    ANAPHYLAXIS, HIVES, SWELLING    Dust 04/26/2021    OTHER (SEE COMMENTS)    DELETED: Pcn [penicillamine] 03/20/2014   Not Verified     Pollen 04/26/2021    OTHER (SEE COMMENTS)    Adhesive Tape 08/23/2018    RASH    Iv Dye [radiology Contrast Iodinated Dyes] 03/20/2014   Not Verified RASH    Sulfa Antibiotics 10/12/2020    RASH    Rash on penis and diarrhea       Code Status Information     Code Status    Not on file        Patient Instructions       Yadira Carbone MD        Lumbar and Thoracic Spinal Fusion Discharge Instructions   [facility] Telephone Number: [phone]  Activity  Mobility  Walking is highly encouraged throughout the day, as tolerated.   Smaller distances are more easily tolerated.  Your goal is to increase the distance you walk each day.  Remember to listen to your body.  Exercise caution in adverse weather or terrain conditions.    Stairs  You may ascend and descend stairs as you tolerate.   Be safe and deliberate. Hold the handrail and advance one step at a time.  Avoid step-stools and ladders.     Restrictions  No twisting, pulling, pushing, or lifting items with a force greater than 10 pounds. (~Gallon of Milk)  No lifting objects above the level of your shoulders.  No bending with your back - you may bend at the knees and hips, maintaining a straight back.  Typically, your  restrictions will be lessened at your 6-week follow-up appointment, however we appreciate your compliance with restrictions until directed otherwise.   If you have been provided a walking aid such as a walker or cane, please use as directed.    Sitting  Fatigue is common after surgery and you may find respite in sitting. This is normal and encouraged. Please remember to be deliberately mobile throughout the day. Change your position frequently, as your muscles may get sore and stiff without movement.    It is recommended to sit in a chair which allows your knees to be at about the same level as your hips. This makes rising from a seated position more feasible.   Avoid overstuffed or plush chairs. Medium to firm chairs with straight backs give better support.   Recliners are OK but you may need to add pillows to avoid sinking into the chair.  Use a raised toilet seat if needed.  Change position every 20-30 minutes throughout the day (example: after sitting, stand, then you can sit again for another 20-30 minutes).    Sexual Activity  You may resume presurgical sexual activity two weeks following surgery as tolerated and when approved by your surgeon.  Discontinue sexual activity if it causes or exacerbates your pain.     Exercise/Fitness Activity  Do not participate in this activity during the two weeks following surgery, unless instructed otherwise.  Your surgeon will lessen restrictions and progress your activity level when appropriate.     Driving  You may NOT drive while taking narcotics or muscle relaxants.  Back and leg pain have been shown to decrease breaking reaction time, particularly after surgery, however, there is no specific time to return to driving.   When you feel able and safe to drive you may return to driving. Slowly advance the complexity of your driving from short distance driving on local streets and in parking lots to longer distance driving including highway driving.     Travel  Avoid air  travel and long-distance automobile travel the first two weeks following surgery.     Bracing/Corset  You may not require an immobilizer or brace for your back unless your surgeon has indicated otherwise.   If you have been provided a brace or corset  Use when out of bed except when showering.  You may wear even when toileting.  Anticipate wearing for 6-12 weeks after surgery; exact time to be determined by surgeon.   Apply/remove your brace when sitting/standing at side of your bed.     Sleeping  You will require plenty of rest and sleep after surgery.   Use the position that provides you the most comfort.  You may use a pillow under knees, between legs, or behind back (if lying on your side), for comfort.   Log roll to turn and rise from a recumbent position.   You may have difficulty sleeping at night. This is not abnormal. If you experience this, try to avoid napping during the day.   It is common to fatigue easily after surgery, this will typically improve one month after your surgery.   Maintain clean sheets and pillow cases.   Rub, with a clean towel to dry.     Return to work  When cleared by surgeon. Discuss specific work activities with your surgeon at your follow-up visit.  Light/sedentary type work may be possible 2 weeks post-op.  Most work activities are permitted approximately 6-12 weeks after surgery.     Dressing and Wound Care  You will have a water-resistant, clear, adhesive dressing over your wound. Please leave this dressing in place for 5 days after surgery.   Remove dressing 5 days after surgery and inspect your wound. You will find glue and mesh tape over your wound, please leave these intact. The dressing should remain in place after discharge from the hospital, as long as it is intact, with a good seal, and there is no leakage.    Start dressing changes 5 days after leaving the hospital with gauze and tegaderm.  Change as needed for moisture in the dressing.    You may start showering 3  days after leaving the hospital.  Keep all dressings on while taking a shower, then place a clean and dry dressing after showering. Do not scrub over the dressing.  Always wash hands before and after dressing changes.   Watch incision for any worsening redness, increased drainage, increased warmth or opening of the incision.  Call your surgeon’s office/answering service if you notice any of these.  Do not apply lotions or ointments on or near incision.    Bathing  You may shower over the adhesive dressing (Tegaderm) that is in place starting 3 days after surgery.  Have someone inspect your dressing prior to each shower to ensure the integrity of the seal at the dressing edges. If the bandage integrity is compromised, please apply an additional adhesive over the existing dressing.   Do not submerge your wound. No tub bathing, swimming, use of steam rooms or saunas for 6 weeks following surgery and when permitted by your surgeon.  Do not scrub your incision site however you may allow soapy water to run over the site after your first post-operative visit.   Dab the site dry with a clean towel.    Diet and Constipation Prevention  Your appetite may be poor. Your desire for food will return.  Drink plenty of liquids (water, juice) each day to prevent dehydration.  Maintain a healthy, well balanced diet that includes plenty of protein following surgery.  Foods that are high in fiber (bran, vegetables, fruit) are good ways to prevent constipation.   Use an over the counter stool softener while taking narcotics to prevent constipation; the use of medications such a Miralax or Milk of Magnesia may be needed.  An enema or glycerin suppository may be necessary if above measures do not work.  Contact your pharmacist, family doctor, or surgeon for advice.    No Smoking  Smoking will inhibit healing.  Smoking has been clearly shown to inhibit solid bony fusion and is counterproductive to the nature of your surgery.   Even one  cigarette a day may cause problems.  Vaping, chewing tobacco, nicotine gum and patches will also inhibit healing.    Pain Management  Expectations  You will have incisional site pain. This is normal and expected after surgery.   You may continue to have leg symptoms which should improve over time.   It is not uncommon 48-72 hours after surgery for patients to experience worsening pain symptoms or return of leg pain/symptoms as post-surgical inflammation sets in. Do not be alarmed as this should gradually improve.   Pain after surgery is normal.  You may have some return of your pre-surgery symptoms and this is normal. Overall your pain should slowly decrease.  You may have good and bad days.  The pain medication Oxycodone may be taken every 4-6 hours as needed for pain.    You can take one extra strength (500mg) Acetaminophen every 6 hours as needed for pain, do not exceed 3500 mg per 24 hours.  If you received Norco instead of Oxycodone; you can take 1-2 tablets of Norco as needed for pain. Norco contains 325 mg of Acetaminophen, so factor that in when supplementing extra strength Tylenol (500mg) Acetaminophen, to not exceed the 3500mg limit per 24 hours.  The muscle relaxer (Flexeril) should be taken 1-3 times daily as needed for muscle tightness and spasm.  You MUST NOT take any anti-inflammatory pain medications for 3-6 months after surgery as these can prevent spinal fusion.  Which include: Aspirin, Motrin, Advil, Aleve, Naprosyn, Voltaren, Mobic, Indocin, Meloxicam, Ibuprofen, Indomethacin, Naproxen, Diclofenac. (COMPLETE LIST IN GUIDEBOOK)      Non-medication Based Techniques  Relaxation techniques  A way to focus your attention other than on your pain. You are innately capable of producing endorphins, a naturally occurring hormone, that can decrease pain. Some examples of relaxation techniques which may result in a release of endorphins include deep breathing, listening to music, prayer, or meditation.   The  use of cold therapy for 20 minutes multiple times per day is encouraged.  You may apply heat to areas of muscle spasm only. Do not apply heat directly over your wound as this can lead to swelling and increased pain.   Gentle stretching may ease muscle spasm.  The idea is to “lengthen” the muscle that is in spasm. Avoid any aggressive bending, lifting, twisting with your neck or back. Gently bending and stretching is OK.  Gentle massage applied to the muscle spasm may help reduce discomfort.    Medication  Tylenol (acetaminophen) is another excellent medication for pain. Take caution as most narcotics contain acetaminophen. You may take a combined daily maximum 3.5 grams (3500mg) of Tylenol (acetaminophen) in 24 hours. More than this can lead to liver injury.   You will be prescribed a narcotic medication. Take this as needed for breakthrough pain. Gradually wean yourself from prescription medication. You may substitute for Tylenol (acetaminophen).  Consider taking pain medication 30 minutes prior to activity to avoid incisional pain.   Take muscle relaxants as needed only if experiencing muscle spasm.  Please allow medications 30-45 minutes to take effect.  Do NOT drink alcohol while on pain medication.  Monitor need for pain medication refills closely.   Call your pharmacy or physician’s office at least five days before you run out of pain medication. If calling physician office after 3 pm, requests will be addressed on the next business day. Calls for refills on Fridays, holidays, and weekends may result in a processing delay of your refill until the next business day.     Post-op Office Visit  Patients are routinely seen 2 weeks, 6 weeks, 3 months, 6 months, and 1 year after surgery.   You will be scheduled for a post-surgical visit two weeks after surgery. Please confirm this appointment.  It is very important that you keep this appointment.  We recommend making a list of questions to bring with you as it is not  uncommon for patients to forget questions while being seen.  Schedule a one week follow up with your Primary Care Physician. It is a good opportunity to review all your medications including any new additions we may provide.     When to contact your surgeon’s team:  Temperature > 101.5°F or 38.5°C.   Increasing pain, swelling, redness, odor, or drainage from your incision.  Separation of incision.  Sudden reappearance of pain that won’t go away with pain medication.  New numbness or weakness to arms or legs.  Difficulty urinating or if incontinence of your bowel.   Headaches that worsen when standing/sitting and resolve when laid flat.  Any concerns, unanswered questions, or new problems.     Go directly to the ER or CALL 911 if you:  become short of breath  have chest pain  cough up blood  have unexplained anxiety with breathing      Patient Isolation Status     None to display      Microbiology Results (All)     None      Immunizations     Name Date      Covid-19 Pfizer 12/06/21     Covid-19 Pfizer 04/12/21     Covid-19 Pfizer 03/22/21     Covid-19 Pfizer Bivalent 10/28/22     INFLUENZA 09/23/21     INFLUENZA 10/23/20     INFLUENZA 10/18/19     INFLUENZA 10/19/18     INFLUENZA 12/09/16     INFLUENZA 09/29/15     INFLUENZA 11/14/14     INFLUENZA 10/23/12     Pneumococcal (Prevnar 13) 08/26/16     Pneumovax 23 10/19/18     Pneumovax 23 02/06/07     Zoster Vaccine Live (Zostavax) 06/26/15        Follow-up Information     Salome Jones PA Follow up in 2 week(s).    Specialty: Physician Assistant  Contact information:  [address]  [address]  [facility] [address] [phone]             Vladimir Kaufman MD Follow up in 1 week(s).    Specialty: Internal Medicine  Contact information:  [address]  [address]  [facility] [address] [phone]                        Your Home Meds List      TAKE these medications       Instructions Authorizing Provider Morning Afternoon Evening As Needed   albuterol 108  (90 Base) MCG/ACT Aers  Commonly known as: ProAir HFA      Inhale 2 puffs into the lungs every 6 (six) hours as needed for Wheezing.   Vladimir Kaufman         Arnuity Ellipta 100 MCG/ACT Aepb  Generic drug: fluticasone furoate      Inhale 1 puff into the lungs daily.          aspirin 81 MG Chew      Chew 1 tablet (81 mg total) by mouth daily.          cyclobenzaprine 5 MG Tabs  Commonly known as: Flexeril      Take 1 tablet (5 mg total) by mouth 3 (three) times daily as needed.          dilTIAZem HCl ER Beads 240 MG Cp24  Commonly known as: TIAZAC      Take 1 capsule (240 mg total) by mouth 2 (two) times daily.          enalapril 2.5 MG Tabs  Commonly known as: Vasotec      Take 1 tablet (2.5 mg total) by mouth once daily.   Vladimir Kaufman         furosemide 40 MG Tabs  Commonly known as: Lasix      Take 1 tablet (40 mg total) by mouth daily as needed.          Glucosamine 750 MG Tabs      Take 1 tablet by mouth daily.          levothyroxine 200 MCG Tabs  Commonly known as: Synthroid      TAKE 1 TABLET(200 MCG) BY MOUTH EVERY MORNING BEFORE BREAKFAST   Vladimir Kaufman         LIPOFLAVONOID OR      Take 1 tablet by mouth daily.          meclizine 25 MG Tabs  Commonly known as: Antivert      Take 1 tablet (25 mg total) by mouth 3 (three) times daily as needed.   Hong Delgadillo         metFORMIN  MG Tb24  Commonly known as: Glucophage XR      Take 1 tablet (500 mg total) by mouth daily with breakfast.          methocarbamol 750 MG Tabs  Commonly known as: Robaxin      Take 1 tablet (750 mg total) by mouth 3 (three) times daily.          Multi-Vitamin/Minerals Tabs      Take 1 tablet by mouth daily.          oxyCODONE-acetaminophen  MG Tabs  Commonly known as: Percocet  Next dose due: 2pm as needed      Take 1-2 tablets by mouth every 4 to 6 hours.   Lovely Loza         predniSONE 20 MG Tabs  Commonly known as: Deltasone              rosuvastatin 5 MG Tabs  Commonly known as: Crestor      Take 1  tablet (5 mg total) by mouth nightly.          tamsulosin 0.4 MG Caps  Commonly known as: Flomax      TAKE 1 CAPSULE(0.4 MG) BY MOUTH DAILY   Nate P Laureano         triamcinolone 0.1 % Oint  Commonly known as: Kenalog      Apply thinly to distal legs for itching relief. Use for 2-3 days only.          ZyrTEC Allergy 10 MG Caps  Generic drug: Cetirizine HCl      Take 1 tablet by mouth daily.                Where to Get Your Medications      Please  your prescriptions at the location directed by your doctor or nurse    Bring a paper prescription for each of these medications  oxyCODONE-acetaminophen  MG Tabs           375-375-A - MAR ACTION REPORT  (last 48 hrs)    ** SITE UNKNOWN **     Order ID Medication Name Action Time Action Reason Comments    [id number] bisacodyl (Dulcolax) 10 MG rectal suppository 10 mg 06/04/24 1537 Given      [id number] cetirizine (ZyrTEC) tab 10 mg 06/04/24 0832 Given      [id number] cetirizine (ZyrTEC) tab 10 mg 06/05/24 1018 Given      [id number] cyclobenzaprine (Flexeril) tab 10 mg 06/04/24 0620 Given      [id number] dilTIAZem ER (Dilacor XR) 24 hr cap 240 mg 06/03/24 2100 Given      [id number] dilTIAZem ER (Dilacor XR) 24 hr cap 240 mg 06/04/24 0832 Given      [id number] dilTIAZem ER (Dilacor XR) 24 hr cap 240 mg 06/04/24 2135 Given      [id number] dilTIAZem ER (Dilacor XR) 24 hr cap 240 mg 06/05/24 1018 Given      [id number] docusate sodium (Colace) cap 100 mg 06/03/24 2100 Given      [id number] docusate sodium (Colace) cap 100 mg 06/04/24 0833 Given      [id number] docusate sodium (Colace) cap 100 mg 06/05/24 1018 Given      [id number] enalapril (Vasotec) tab 2.5 mg 06/04/24 0833 Given      [id number] enalapril (Vasotec) tab 2.5 mg 06/05/24 1018 Given      [id number] fluticasone furoate (Arnuity Ellipta) 100 MCG/ACT inhaler 1 puff 06/04/24 0834 Given      [id number] fluticasone furoate (Arnuity Ellipta) 100 MCG/ACT inhaler 1 puff 06/05/24 1018 Given      [id number] levothyroxine  (Synthroid) tab 200 mcg 06/04/24 0620 Given      [id number] levothyroxine (Synthroid) tab 200 mcg 06/05/24 0506 Given      [id number] magnesium hydroxide (Milk of Magnesia) 400 MG/5ML oral suspension 30 mL 06/04/24 0834 Given      [id number] methocarbamol (Robaxin) tab 750 mg 06/03/24 1557 Given      [id number] methocarbamol (Robaxin) tab 750 mg 06/03/24 2100 Given      [id number] methocarbamol (Robaxin) tab 750 mg 06/04/24 0833 Given      [id number] methocarbamol (Robaxin) tab 750 mg 06/04/24 1537 Given      [id number] methocarbamol (Robaxin) tab 750 mg 06/04/24 2135 Given      [id number] methocarbamol (Robaxin) tab 750 mg 06/05/24 1018 Given      [id number] oxyCODONE-acetaminophen (Percocet)  MG per tab 2 tablet (Or Linked Group #1) 06/03/24 1502 Given      [id number] oxyCODONE-acetaminophen (Percocet)  MG per tab 2 tablet 06/03/24 2334 Given      [id number] oxyCODONE-acetaminophen (Percocet)  MG per tab 2 tablet 06/04/24 0429 Given      [id number] oxyCODONE-acetaminophen (Percocet)  MG per tab 2 tablet 06/04/24 0832 Given      [id number] oxyCODONE-acetaminophen (Percocet)  MG per tab 2 tablet 06/04/24 1430 Given      [id number] oxyCODONE-acetaminophen (Percocet)  MG per tab 2 tablet 06/04/24 2314 Given      [id number] oxyCODONE-acetaminophen (Percocet)  MG per tab 2 tablet 06/05/24 0651 Given      [id number] oxyCODONE-acetaminophen (Percocet)  MG per tab 2 tablet 06/05/24 1018 Given      [id number] rosuvastatin (Crestor) tab 5 mg 06/03/24 2100 Given      [id number] rosuvastatin (Crestor) tab 5 mg 06/04/24 2136 Given      [id number] sennosides (Senokot) tab 17.2 mg 06/03/24 2100 Given      [id number] sennosides (Senokot) tab 17.2 mg 06/04/24 2135 Given      [id number] tamsulosin (Flomax) cap 0.4 mg 06/04/24 0833 Given      [id number] tamsulosin (Flomax) cap 0.4 mg 06/05/24 1018 Given              Recent Vital Signs    Flowsheet Row Most Recent Value   /51 Filed at 06/05/2024 1157    Pulse 70 Filed at 06/05/2024 1157   Resp 16 Filed at 06/05/2024 1157   Temp 98.1 °F (36.7 °C) Filed at 06/05/2024 1157   SpO2 91 % Filed at 06/05/2024 1157      Patient's Most Recent Weight    Flowsheet Row Most Recent Value   Patient Weight 112 kg (247 lb)      CPAP Settings (Inpatient)    Flowsheet Row Most Recent Value   Mode Spontaneous   Interface Patient provided mask      Lab Results Last 24 Hours    No matching results found        H&P - H&P Note      H&P signed by Salome Jones PA at 5/29/2024 11:20 AM  Version 1 of 1    Author: Salome Jones PA Service: Orthopedics Author Type: Physician Assistant    Filed: 5/29/2024 11:20 AM Date of Service: 5/29/2024 11:20 AM Status: Signed    : Salome Jones PA (Physician Assistant)            Patient: Ravindra Butler Jr.  Medical Record Number: [id number]  Referring Physician:  No ref. provider found  PCP: Vladimir Kaufman MD        HISTORY OF CHIEF COMPLAINT:    Ravindra Butler Jr. is a 69 year old male, who comes today for pre-op visit     VAS Pain Score: 3 /10                   Past Medical History:   Diagnosis Date    Arthritis, degenerative 3/20/2015    Asthma      Asthma, mild intermittent, uncomplicated 6/26/2015    Disorder of thyroid      DJD (degenerative joint disease)      Essential hypertension 6/26/2015    Essential hypertension with goal blood pressure less than 130/80 8/26/2016    Extrinsic asthma, unspecified      High blood pressure      High cholesterol      Hypercholesteremia      Hyperpiesia 3/20/2015    Hypothyroid      Hypothyroidism due to acquired atrophy of thyroid 6/26/2015    Malignant neoplasm of prostate (HCC) 4/30/2020    Mild intermittent asthma without complication 6/26/2015    Non-insulin dependent type 2 diabetes mellitus (HCC)      Obstructive apnea 03/24/2021     DMG SPLIT NIGHT AHI 61 BIPAP 25/20    Osteoarthritis      Prediabetes      Primary osteoarthritis involving multiple joints 6/26/2015    Sleep  apnea       bipap    Thyroid activity decreased 3/20/2015    Unspecified essential hypertension      Visual impairment       glasses            Past Surgical History:   Procedure Laterality Date    BREAST BIOPSY   1966     Dr. Hogan    CHOLECYSTECTOMY        COLECTOMY   2021      laparoscopic right hemicolectomy, lysis of adhesions, possible open     COLONOSCOPY N/A 2021     Procedure: COLONOSCOPY, with polypectomy;  Surgeon: Polo Salas MD;  Location: [facility]    COLONOSCOPY N/A 10/3/2022     Procedure: COLONOSCOPY with polypectomy;  Surgeon: Polo Salas MD;  Location: [facility]    NDSC ABLATION & RCNSTJ ATRIA LMTD W/O BYPASS        OTHER SURGICAL HISTORY   2020     SPACE OAR- Dr. Isaac      OTHER SURGICAL HISTORY   2020     PNBx- Dr. Bernard     REMOVAL GALLBLADDER                Family History   Problem Relation Age of Onset    Heart Attack Father               Cancer Father           ureteral cancer    Stroke Mother                 Social History    Socioeconomic History      Marital status: Life Partner    Tobacco Use      Smoking status: Never      Smokeless tobacco: Never    Vaping Use      Vaping Use: Never used    Substance and Sexual Activity      Alcohol use: Yes        Alcohol/week: 10.0 standard drinks of alcohol        Types: 10 Shots of liquor per week        Comment: 1-2 mixed drinks per day      Drug use: Never      Current Medications:         Current Outpatient Medications   Medication Sig Dispense Refill    torsemide 20 MG Oral Tab Take 2 tablets (40 mg total) by mouth daily. 180 tablet 3    albuterol (PROAIR HFA) 108 (90 Base) MCG/ACT Inhalation Aero Soln Inhale 2 puffs into the lungs every 6 (six) hours as needed for Wheezing. 1 each 3    dilTIAZem HCl  MG Oral Capsule SR 24 Hr Take 1 capsule (240 mg total) by mouth 2 (two) times daily. 180 capsule 3    rosuvastatin 5 MG Oral Tab Take 1 tablet (5 mg total) by  mouth nightly. 90 tablet 3    triamcinolone 0.1 % External Ointment Apply thinly to distal legs for itching relief. Use for 2-3 days only. 15 g 0    tamsulosin (Flomax) cap Take 1 capsule (0.4 mg total) by mouth daily. 90 capsule 3    metFORMIN  MG Oral Tablet 24 Hr Take 1 tablet (500 mg total) by mouth daily with breakfast. 90 tablet 3    fluticasone furoate (ARNUITY ELLIPTA) 100 MCG/ACT Inhalation Aerosol Powder, Breath Activated Inhale 1 puff into the lungs daily. 3 each 3    enalapril 2.5 MG Oral Tab Take 1 tablet (2.5 mg total) by mouth once daily. 90 tablet 3    levothyroxine 200 MCG Oral Tab Take 1 tablet (200 mcg total) by mouth before breakfast. 90 tablet 3    MECLIZINE 25 MG Oral Tab TAKE 1 TABLET(25 MG) BY MOUTH THREE TIMES DAILY AS NEEDED 30 tablet 3    aspirin 81 MG Oral Chew Tab Chew by mouth daily.        Glucosamine 750 MG Oral Tab Take by mouth.        Multiple Vitamins-Minerals (MULTI-VITAMIN/MINERALS) Oral Tab Take 1 tablet by mouth daily.        Vitamins-Lipotropics (LIPOFLAVONOID OR) Take by mouth.        acetaminophen 500 MG Oral Tab Take 500 mg by mouth every 6 (six) hours as needed for Pain.        Cetirizine HCl (ZYRTEC ALLERGY) 10 MG Oral Cap Take by mouth.             REVIEW OF SYSTEMS     A comprehensive 10 point review of systems was completed.  Pertinent positives and negatives noted in the the HPI.      PHYSICAL EXAMIMATION   PHYSICAL EXAMINATION: Ravindra Butler Jr. is a 69 year old male who is observed sitting in the exam room alert and oriented times three.   RESPIRATORY RATE: 18 He looks consistent his stated age.    There were no vitals taken for this visit.  The patient is well developed, appropriately built      Ambulation: Patient displays antalgic gait and unsteady - unable to toe walk or heel walk     Clinical Spinal Alignment: stooped forward slightly      ROM:               ROM testing demonstrates slight decrease forward bending      Palpation:       Patient  demonstrates no TTP      Strength:        Patient demonstrates normal motor examination 5/5 except 3/5 left DF      Sensation:  Patient demonstrates normal sensory examination 2/2      Reflexes:        Patient demonstrates diminished lower extremity reflexes      Straight Leg Testing: Was neg      Special Tests:      Test Right   (POS or NEG) Left   (POS or NEG)                           Clonus Neg Neg      EYES: EOMI, RADHA  SKIN EXAM: Skin is intact, head, neck, trunk and arms/legs. No rashes, mottling or ulcerations.  LYMPH EXAM: There is no edema in bilateral lower extremities.  VASCULAR EXAM:  pulses are normal bilaterally, with good distal perfusion. No clubbing or cyanosis. Calves supple, NT   ABDOMINAL EXAM: Abdomen is soft, NT, BS present  MUSCULOSKELETAL: Outlined above        IMAGING STUDIES:  Imaging studies reviewed with the patient today     MRI:  Narrative   DATE OF SERVICE: 02.28.2024  MRI LUMBAR SPINE WITHOUT CONTRAST    CLINICAL INDICATION: Left leg weakness.    COMPARISON: No relevant prior studies currently available at this institution for direct comparison,  per Epic notes.    TECHNIQUE: Routine noncontrast MRI lumbar spine utilizing standard protocol including sagittal  T1-weighted, T2-weighted, and STIR sequences, and axial T1- and T2-weighted sequences. Closed 1.5  Nuria multichannel MR system was utilized.    FINDINGS: The lowest lumbar type vertebral body is designated L5; if interventional procedures are  contemplated, plain radiographs are strongly recommended to exclude anatomic variations in the  lumbar spine.    ALIGNMENT: Slight retrolisthesis of L1 on L2. Mild retrolisthesis of L2 on L3.    BONE MARROW SIGNAL: Endplate marrow signal adjacent to L2-L3 level(s) with characteristics  consistent with Modic type I and/or type II degenerative changes. Focus of fat or hemangioma within  the L1, L3, and L4 vertebral bodies. Bone marrow signal otherwise appears unremarkable.    CORD/CONUS:  Tip of the conus terminates at the lower L1. The distal visualized cord demonstrates  prominence of the central canal which may be within upper limits of normal. However, advise further  evaluation of the cervical and thoracic cord utilizing MRI cervical spine thoracic spine spine  studies.    VERTEBRAL BODY HEIGHTS: Well-maintained    DISCS: Disc desiccation along with notable decrease disc height at multiple levels most significant  at the lower third    DISC LEVELS    T11-T12: Mild posterior facet arthropathy but otherwise grossly unremarkable, based only on sagittal  sequence.    T12-L1: Mild posterior facet arthropathy but otherwise unremarkable.    L1-L2: Mild disc bulge and small posterior focal disc extrusion with disc material migrating  slightly below the superior endplate of L2. There is mild prominence of the dorsal epidural fat.  Overall, at least mild central spinal canal stenosis. No significant foraminal stenosis.    L2-L3: Mild diffuse disc bulge with suggestion of shallow broad-based left paracentral/medial  foraminal disc protrusion or disc osteophyte complex. There is hypertrophy of the ligamentum flavum  and posterior facets from degenerative changes. There is mild-to-moderate central spinal canal  stenosis and bilateral foraminal stenosis.    L3-L4: Mild diffuse disc bulge and left foraminal disc protrusion and annular tear at least abutting  and slightly flattening the traversing left L4 nerve root. There is hypertrophy of the ligamentum  flavum and posterior facets from degenerative changes. There is at least mild central spinal canal  stenosis and left lateral recess stenosis. Foraminal disc bulge and/or disc osteophyte complex along  with posterior facet arthropathy contribute to mild foraminal stenosis.    L4-L5: Diffuse disc bulge eccentric to the left. There is moderate hypertrophy of the ligamentum  flavum and posterior facets from degenerative changes. Overall, these factors. To severe  central  spinal canal stenosis and bilateral lateral recess stenosis with flattening and probable impingement  upon the traversing L5 nerve roots. There is significant crowding of the intrathecal nerve roots.  Foraminal disc bulge and/or disc osteophyte complex along posterior facet arthropathy result in  mild-to-moderate bilateral foraminal stenosis. There is extruded disc component migrating superiorly  into the neuroforamina on sagittal T2 image 13 series.    L5-S1: Mild disc bulge. At least mild hypertrophic posterior facet arthropathy. Otherwise,  unremarkable with no significant central spinal canal or foraminal stenosis.    Evaluation of the following surrounding structures is very suboptimal, but visualized segments/areas  of the:  ABDOMINAL AORTA: Appears unremarkable.    KIDNEYS: Appear grossly unremarkable.    ADRENAL GLANDS: Appear grossly unremarkable.    PARASPINAL SOFT TISSUES: Cluster of left para-aortic lymph nodes are increased in number although  relatively small size. Advise correlation with contrast-enhanced CT abdomen and pelvis study for  further evaluation.    BONES OF THE PELVIS: Appear grossly unremarkable.    Impression   IMPRESSION:  1. Moderate multilevel lumbar spondylosis with most significant findings at L4-L5 where there is  severe central spinal canal and bilateral lateral recess stenosis. Please see above for details and  additional findings at other levels of the lumbar spine and correlate clinically.     2. Prominence of the central canal of the spinal cord which may be within upper limits of normal.  However, advise further evaluation of the cervical and thoracic cord utilizing noncontrast MRI  cervical spine thoracic spine spine studies.    3. Cluster of left para-aortic lymph nodes are small but increased in number. Advise correlation  with contrast-enhanced CT abdomen and pelvis study for further evaluation.                                                                                              MEDICAL DECISION MAKING:      Plan:   Diagnoses and all orders for this visit:     Left leg weakness     Lumbar radiculopathy     Spondylolisthesis, lumbar region  -     XR LUMBAR SPINE (MIN 4 VIEWS) (CPT=72110); Future           Ravindra Butler Jr. is a 69 year old with L4-5 spondylolisthesis and severe stenosis causing claudication and left foot drop with increase in falls and unsteadiness with gait. I discussed the nature of his condition in extensive detail and treatment options including surgical verus non surgical options.We explored various non-surgical treatment options.       I reviewed the imaging and physical exam findings with the patient.  We discussed the options of living with the symptoms, continued nonoperative management, or proceeding to surgical intervention.  Ravindra Butler Jr. has had a limitation in age appropriate activities of daily living due to this condition.  Since Ravindra Butler Jr. has not had any improvement with nonoperative management for more than 6 months including physical therapy, medications and injections, Ravindra Butler Jr. would like to consider surgical intervention.  I reviewed the risks and benefits of the surgery including but no limited to infection, incidental durotomy, neuropraxia, worsening symptoms, incomplete pain relief, DVT, PE, and complications related to general anesthesia.  We discussed pre-operative clearance, as well as post-acute care planning of home with services.  We discussed that the estimated length of hospital stay would be 1-2 days.  I printed my pre-operative guided and gave it to the patient for review. The patient will consider the surgery, review with family, and follow up or call if she would like to schedule the surgery.  All of the patient's questions have been sought and answered.     Plan for left L4-5 PTP and PSF with instrumentation and laminectomy, anterior instrumentation L4-5 Plate        The patient  indicates understanding of these issues and agrees to the plan.     Thank you very much.      Respectfully yours,        Today I spent a total of 45 minutes evaluating the patient, ordering medication and/or tests, charting, reviewing records and/or imaging, and discussing treatment plan.            Yadira Carbone MD   Minimally Invasive and Complex Spine Surgery Specialist  [facility]    Electronically signed by Salome Jones PA on 5/29/2024 11:20 AM              Consults - MD Consult Notes      Consults signed by Mitra Ghosh MD at 6/2/2024  8:18 AM     Author: Mitra Ghosh MD Service: — Author Type: Physician    Filed: 6/2/2024  8:18 AM Date of Service: 6/2/2024  8:08 AM Status: Signed    : Mitra Ghosh MD (Physician)       PMR remote consult  Brief HPI:  Patient is a 69 year old with L4-5 spondylolisthesis and severe stenosis causing claudication and left foot drop with increase in falls and unsteadiness with gait. He is now s/p L4-L5 XLIF/PSIF and laminectomy. Patients post op course has been complicated  by pain weakness, post op acute blood loss anemia.   PLOF    Type of Home: House   Home Layout: One level  Stairs to Enter : 1     Lives With: Spouse  Drives: Yes  Patient Owned Equipment: Rolling walker;Cane  Patient Regularly Uses: Glasses     Prior Level of [place]: Pt lives with spouse in ranch style home. Pt independent with ADL and mobility. Pt has been ambulating with cane. Pt works as a  for multiple schools. Pt reports multiple falls at home due to L foot drop.     Gait Assessment   Functional Mobility/Gait Assessment  Gait Assistance: Moderate assistance  Distance (ft): 3, 5  Assistive Device: Rolling walker  Recommendations:  Would recommend a short stay in acute rehab for functional upgrading  ELOS 10-14 days  Patient is at a mod assist and would have goals for mod I      Electronically signed by Mitra Ghosh MD on 6/2/2024  8:18 AM           D/C  Summary    No notes of this type exist for this encounter.     Imaging Results (HF patients)    Chest X-Ray Results (HF patients only)    No exam resulted this encounter.      2D Echocardiogram Results (HF patients only)    No exam resulted this encounter.      Cath Angiogram Results (HF Patients only)    No exam resulted this encounter.          Physical Therapy Notes (last 72 hours)      Physical Therapy Note signed by Vicki Rubin PT at 6/3/2024  3:53 PM  Version 1 of 1    Author: Vicki Rubin PT Service: Rehab Author Type: Physical Therapist    Filed: 6/3/2024  3:53 PM Date of Service: 6/3/2024  3:36 PM Status: Signed    : Vicki Rubin PT (Physical Therapist)          PHYSICAL THERAPY TREATMENT NOTE - INPATIENT    Room Number: 375/375-A     Session: 3    Number of Visits to Meet Established Goals: 4    Presenting Problem: s/p L L4-5 prone lateral interbody fusion and posterior spinal fusion with instrumentation laminectomy, anterior instrumentation 5/30/24  Co-Morbidities : HTN, Aflutter, asthma, L foot drop    PHYSICAL THERAPY MEDICAL/SOCIAL HISTORY  History related to current admission: Patient is a 69 year old male admitted on 5/30/2024 from home for elective left L4-5 prone lateral interbody fusion and posterior spinal fusion with instrumentation laminectomy, anterior instrumentation.      HOME SITUATION  Type of Home: House   Home Layout: One level  Stairs to Enter : 1     Lives With: Spouse  Drives: Yes  Patient Owned Equipment: Rolling walker;Cane  Patient Regularly Uses: Glasses     Prior Level of [place]: Pt lives with spouse in ranch style home. Pt independent with ADL and mobility. Pt has been ambulating with cane. Pt works as a  for multiple schools. Pt reports multiple falls at home due to L foot drop.     ASSESSMENT   Patient demonstrates fair progress this session, goals  remain in progress.    Patient continues to function below baseline with bed mobility,  transfers, gait, maintaining seated position, and standing prolonged periods.  Contributing factors to remaining limitations include decreased functional strength, decreased endurance/aerobic capacity, pain, impaired static and dynamic sitting/standing balance, impaired coordination, impaired motor planning, and decreased muscular endurance.  Next session anticipate patient to progress bed mobility, transfers, and gait.  Physical Therapy will continue to follow patient for duration of hospitalization.    Patient continues to benefit from continued skilled PT services: to facilitate return to prior level of function as patient demonstrates high motivation with excellent tolerance to an intensive therapy program .    PLAN  PT Treatment Plan: Bed mobility;Patient education;Gait training;Range of motion;Strengthening;Transfer training  Rehab Potential : Good  Frequency (Obs): 5x/week    CURRENT GOALS   Goal #1 Patient is able to demonstrate supine - sit EOB @ level: minimum assistance      Goal #2 Patient is able to demonstrate transfers Sit to/from Stand at assistance level: minimum assistance      Goal #3 Patient is able to ambulate 25 feet with assist device: walker - rolling at assistance level: minimum assistance.  Upgraded to cga      Goal #4 Pt will demonstrate good understanding of spine precautions   Goal #5     Goal #6     Goal Comments: Goals established on 2024  6/3/2024 all goals ongoing    SUBJECTIVE  \"One position is only comfortable for 20 minutes.\"    OBJECTIVE  Precautions: Spine;Bed/chair alarm (L foot drop, L LE weak)    WEIGHT BEARING RESTRICTION  Weight Bearing Restriction: None                PAIN ASSESSMENT   Ratin  Location: back  Management Techniques: Activity promotion;Body mechanics;Repositioning    BALANCE                                                                                                                       Static Sitting: Fair +  Dynamic Sitting: Fair            Static Standing: Fair -  Dynamic Standing: Poor +    ACTIVITY TOLERANCE                         O2 WALK         AM-PAC '6-Clicks' INPATIENT SHORT FORM - BASIC MOBILITY  How much difficulty does the patient currently have...  Patient Difficulty: Turning over in bed (including adjusting bedclothes, sheets and blankets)?: A Lot   Patient Difficulty: Sitting down on and standing up from a chair with arms (e.g., wheelchair, bedside commode, etc.): A Little   Patient Difficulty: Moving from lying on back to sitting on the side of the bed?: A Lot   How much help from another person does the patient currently need...   Help from Another: Moving to and from a bed to a chair (including a wheelchair)?: A Little   Help from Another: Need to walk in hospital room?: A Little   Help from Another: Climbing 3-5 steps with a railing?: A Lot       AM-PAC Score:  Raw Score: 15   Approx Degree of Impairment: 57.7%   Standardized Score (AM-PAC Scale): 39.45   CMS Modifier (G-Code): CK    FUNCTIONAL ABILITY STATUS  Gait Assessment   Functional Mobility/Gait Assessment  Gait Assistance: Minimum assistance;Contact guard assist  Distance (ft): 12  Assistive Device: Rolling walker  Pattern: L Foot drop    Skilled Therapy Provided    Bed Mobility:  Rolling: NT   Supine<>Sit: modA-    Completed modified log roll to the left at Gabriel> L sidelying to sitting EOB at modA (assist both at BLE and at trunk)  Sit<>Supine: maxA     Again completed modified w/ HOB elevated -     Transfer Mobility:  Sit<>Stand:    With height of bed elevated- modA to RW- v/c for hand placement and to pull feet unde him   Stand<>Sit: CGA- improvement in eccentric control   Gait: Gabriel with a few intermittent periods of CGA w/ RW x 12 feet, LLE foot drop, decr mary lou. Verbalizes aloud sequence for gait - close chair follow     Therapist's Comments:   Patient presents sitting up in bed. Discussed goal of today's therapy session, pt in agreement. Reports 7/10 pain; RN aware  and gave meds prior to starting session. With head of bed elevated, Gabriel to complete modified log roll to left at Gabriel > L sidelying to sitting EOB at modA. Once sitting EOB, pt had incr difficulty reaching foot flat position due to posterior trunk lean. Once his feet reached flat foot he was able to maintain static sitting balance with assist from 1 bedrail. Height of bed elevated for ease of sit to stand [facility] In standing, demonstrated small posterior lean but able to correct overtime. Continues to have difficulty pulling LLE back far enough under him without cues. Stood for about 3 minutes prior to ambulating. Ambulated 12 feet w/ RW at Gabriel w/ a few intermittent periods of CGA; continues to demonstrate L foot drop and requires incr processing time for sequencing gait pattern. After 12 feet pt requesting to sit as left leg felt fatigued. Wheeled back to room and completed sit to stands from bedside chair- working on forward momentum and powering up through his legs. Relies heavily on BUE push from arm rests of chair to reach standing position- from bedside chair required CGA to [facility] for transfer. Stood for ~2 minutes between each transfer, with SBA assist for static standing. On last stand, pt ambulated w/ RW to bed. Sit to stand CGA. Sit to supine maxA. MaxA x 2 for repositioning/boosting in bed. RN aware.     Patient End of Session: In bed;Call light within reach;Needs met;RN aware of session/findings;All patient questions and concerns addressed;SCDs in place;Alarm set;Discussed recommendations with /    PT Session Time: 45 minutes  Gait Training: 15 minutes  Therapeutic Activity: 30 minutes       Physical Therapy Note signed by Darlyn Stafford PT at 6/2/2024  1:35 PM  Version 1 of 1    Author: Darlyn Stafford PT Service: — Author Type: Physical Therapist    Filed: 6/2/2024  1:35 PM Date of Service: 6/2/2024  1:25 PM Status: Signed    : Rivera  Darlyn PEÑA PT (Physical Therapist)          PHYSICAL THERAPY TREATMENT NOTE - INPATIENT    Room Number: 375/375-A     Session: 2     Number of Visits to Meet Established Goals: 4    Presenting Problem: s/p L L4-5 prone lateral interbody fusion and posterior spinal fusion with instrumentation laminectomy, anterior instrumentation 5/30/24  Co-Morbidities : HTN, Aflutter, asthma, L foot drop  Reason for Therapy: Mobility Dysfunction and Discharge Planning     PHYSICAL THERAPY MEDICAL/SOCIAL HISTORY  History related to current admission: Patient is a 69 year old male admitted on 5/30/2024 from home for elective left L4-5 prone lateral interbody fusion and posterior spinal fusion with instrumentation laminectomy, anterior instrumentation.      HOME SITUATION  Type of Home: House   Home Layout: One level  Stairs to Enter : 1     Lives With: Spouse  Drives: Yes  Patient Owned Equipment: Rolling walker;Cane  Patient Regularly Uses: Glasses     Prior Level of [place]: Pt lives with spouse in ranch style home. Pt independent with ADL and mobility. Pt has been ambulating with cane. Pt works as a  for multiple schools. Pt reports multiple falls at home due to L foot drop.     ASSESSMENT   Patient demonstrates fair progress this session, goals  updated to reflect patient performance.    Patient continues to function below baseline with bed mobility, transfers, gait, stair negotiation, and standing prolonged periods.  Contributing factors to remaining limitations include decreased functional strength, decreased endurance/aerobic capacity, pain, impaired standing balance, and decreased muscular endurance.  Next session anticipate patient to progress bed mobility, transfers, gait, and standing prolonged periods.  Physical Therapy will continue to follow patient for duration of hospitalization. Goal for gait upgraded today.    Patient continues to benefit from continued skilled PT services: to facilitate return to  prior level of function as patient demonstrates high motivation with excellent tolerance to an intensive therapy program .    PLAN  PT Treatment Plan: Bed mobility;Patient education;Gait training;Range of motion;Strengthening;Transfer training  Rehab Potential : Good  Frequency (Obs): 5x/week    CURRENT GOALS   Goal #1 Patient is able to demonstrate supine - sit EOB @ level: minimum assistance      Goal #2 Patient is able to demonstrate transfers Sit to/from Stand at assistance level: minimum assistance      Goal #3 Patient is able to ambulate 25 feet with assist device: walker - rolling at assistance level: minimum assistance.  Upgraded to cga      Goal #4 Pt will demonstrate good understanding of spine precautions   Goal #5     Goal #6     Goal Comments: Goals established on 5/31/2024 6/2/2024 all goals ongoing    SUBJECTIVE  \"I want to go to rehab hopefully in [place]\"    OBJECTIVE  Precautions: Spine;Bed/chair alarm (L foot drop, L LE weak)    WEIGHT BEARING RESTRICTION  Weight Bearing Restriction: None                PAIN ASSESSMENT   Rating: 3  Location: back  Management Techniques: Activity promotion;Repositioning;Body mechanics    BALANCE                                                                                                                       Static Sitting: Fair +  Dynamic Sitting: Fair           Static Standing: Fair - (with RW)  Dynamic Standing: Fair - (with RW)    ACTIVITY TOLERANCE                         O2 WALK         AM-PAC '6-Clicks' INPATIENT SHORT FORM - BASIC MOBILITY  How much difficulty does the patient currently have...  Patient Difficulty: Turning over in bed (including adjusting bedclothes, sheets and blankets)?: A Lot   Patient Difficulty: Sitting down on and standing up from a chair with arms (e.g., wheelchair, bedside commode, etc.): A Lot   Patient Difficulty: Moving from lying on back to sitting on the side of the bed?: A Lot   How much help from another person  does the patient currently need...   Help from Another: Moving to and from a bed to a chair (including a wheelchair)?: A Little   Help from Another: Need to walk in hospital room?: A Little   Help from Another: Climbing 3-5 steps with a railing?: A Lot       AM-PAC Score:  Raw Score: 14   Approx Degree of Impairment: 61.29%   Standardized Score (AM-PAC Scale): 38.1   CMS Modifier (G-Code): CL    FUNCTIONAL ABILITY STATUS  Gait Assessment   Functional Mobility/Gait Assessment  Gait Assistance: Contact guard assist  Distance (ft): 12, 10  Assistive Device: Rolling walker  Pattern:  (Decr mary lou/step length/heel-toe pattern, step-to pattern)    Skilled Therapy Provided    Bed Mobility:  Rolling: NT   Supine<>Sit: NT   Sit<>Supine: NT     Transfer Mobility:  Sit<>Stand: min assist on 1st transfer, mod assist x 2 during 2nd transfer both with RW and cues provided for safety, set-up   Stand<>Sit: cga-min assist with cues for hand placement   Gait: cga    Therapist's Comments: RN approved session, patient is sitting in the recliner chair with LE elevated.  Patient performed sit->stand to the RW min assist with  cues for technique and set-up/safety, patient initially exhibited some posterior trunk lean after getting up to stand but was able to correct placing feet more under him, patient ambulated with the RW with chair follow with cues provided for safety and sequence, reported fatigue and needs to sit down to rest, cues for safety to return to sitting.  Patient required mod assist x 2 to get up to standing position again and ambulated another 10 ft with chair follow using the RW, patient exhibits downward gaze verbalizing aloud sequence for gait.  Patient returned to sitting due to fatigue, patient was wheeled back to the room.  Patient is more steady when up with the RW today and made progress to gait tolerance.       THERAPEUTIC EXERCISES  Lower Extremity LAQ, educated on ankle DF and encouraged to do on the L foot      Upper Extremity      Position Sitting     Repetitions   10   Sets        Patient End of Session: Up in chair;Needs met;Call light within reach;RN aware of session/findings;All patient questions and concerns addressed;Alarm set    PT Session Time: 30 minutes  Gait Training: 10 minutes  Therapeutic Activity: 8 minutes  Therapeutic Exercise: 5 minutes   Neuromuscular Re-education:  minutes                         Occupational Therapy Notes (last 72 hours)      Occupational Therapy Note signed by Pina Mejia OT at 6/2/2024  1:11 PM  Version 1 of 1    Author: Pina Mejia OT Service: Rehab Author Type: Occupational Therapist    Filed: 6/2/2024  1:11 PM Date of Service: 6/2/2024 11:55 AM Status: Signed    : Pina Mejia OT (Occupational Therapist)       OCCUPATIONAL THERAPY TREATMENT NOTE - INPATIENT     Room Number: 375/375-A  Session: 2   Number of Visits to Meet Established Goals: 5    Presenting Problem: s/p Left L4-5 fusion on 5/30      ASSESSMENT   Patient demonstrates fair progress this session, goals remain in progress.    Patient continues to function below baseline with  LB/standing ADL and mobility .   Contributing factors to remaining limitations include  pain, LLE stability, balance, endurance, use of adaptive techniques .  Next session anticipate patient to progress toileting, lower body dressing, bed mobility, and transfers.  Occupational Therapy will continue to follow patient for duration of hospitalization.    Patient continues to benefit from continued skilled OT services: to facilitate return to prior level of function as patient demonstrates high motivation with excellent tolerance to an intensive therapy program .     Patient reports agreeable to intensive therapy program, but prefers one closer to his home.       OT Device Recommendations: TBD    History: Patient is a 69 year old male admitted on 5/30/2024 with Presenting Problem: s/p Left L4-5 fusion on 5/30.  Co-Morbidities : HTN, Aflutter, asthma, L foot drop     WEIGHT BEARING RESTRICTION  Weight Bearing Restriction: None                Recommendations for nursing staff:   Transfers:2-person for safety with RW and gait belt  Toileting location: bedside commode    TREATMENT SESSION:  Patient Start of Session: bedside chair  FUNCTIONAL TRANSFER ASSESSMENT  Sit to Stand: Chair  Edge of Bed: Not Tested  Chair: Dependent (min first demo, mod x2 after ambulation to devine, seated rest break)    BED MOBILITY  Sit to Supine (OT): Not Tested    BALANCE ASSESSMENT     FUNCTIONAL ADL ASSESSMENT  LB Dressing Seated: Moderate Assist (donning pullups to knees min assist with reacher and increased time, min assist to don slippers fully over heels)  LB Dressing Standing: Moderate Assist (min assist plus min for balance while donning pull-ups to waist)  Toileting Standing: Moderate Assist (min assist plus min for balance while donning pull-ups to waist)      ACTIVITY TOLERANCE: WFL, one seated rest break during mobility with RW                         O2 SATURATIONS       EDUCATION PROVIDED  Patient: Role of Occupational Therapy; Functional Transfer Techniques; Fall Prevention; Surgical Precautions; Compensatory ADL Techniques; Proper Body Mechanics  Patient's Response to Education: Verbalized Understanding; Requires Further Education      Equipment used: RW, reacher  Demonstrates functional use, Would benefit from additional trial        Therapist comments: Patient motivated and participatory; recalled 3/3 spine precautions independently with minimal cueing required to follow this session; performed LB ADL in chair with increased time; functional mobility to devine to simulate household distances with RW, CGA to min assist, and close chair follow; no noted losses of balance or posterior lean this session, however did require increased assistance for sit to stand transfer as he fatigued. Will continue to follow.     Patient End of Session:  Up in chair;Needs met;Call light within reach;RN aware of session/findings;All patient questions and concerns addressed    SUBJECTIVE  \"This seems better than yesterday\"    PAIN ASSESSMENT  Rating: 3  Location: back, LLE  Management Techniques: Activity promotion;Body mechanics;Repositioning     OBJECTIVE  Precautions: Spine;Bed/chair alarm (L foot drop, L LE weak)    AM-PAC ‘6-Clicks’ Inpatient Daily Activity Short Form  -   Putting on and taking off regular lower body clothing?: A Lot  -   Bathing (including washing, rinsing, drying)?: A Lot  -   Toileting, which includes using toilet, bedpan or urinal? : A Lot  -   Putting on and taking off regular upper body clothing?: A Little  -   Taking care of personal grooming such as brushing teeth?: A Little  -   Eating meals?: None    AM-PAC Score:  Score: 16  Approx Degree of Impairment: 53.32%  Standardized Score (AM-PAC Scale): 35.96    PLAN  OT Treatment Plan: Balance activities;Energy conservation/work simplification techniques;ADL training;Functional transfer training;Patient/Family education;Patient/Family training;Endurance training;Equipment eval/education;Compensatory technique education;Continued evaluation  Rehab Potential : Good  Frequency: 5x/week    OT Goals: ongoing 6/2  ADL Goals  Patient will perform toileting with min A and AE PRN  Patient will perform LB dressing with min A and AE PRN     Functional Transfer Goals  Patient will perform bed mobility supine to sit with min A  Patient will perform bed mobility sit to supine with min A  Patient will perform toilet transfer to BSC with min A     Additional Goals:  Patient will state spine precautions and maintain during ADL    OT Session Time: 25 minutes  Self-Care Home Management: 10 minutes  Therapeutic Activity: 15 minutes             Video Swallow Study Notes    No notes of this type exist for this encounter.     SLP Notes    No notes of this type exist for this encounter.     Multidisciplinary  Problems     Active Goals        Problem: Patient/Family Goals    Goal Priority Disciplines Outcome Interventions   Patient/Family Long Term Goal     Interdisciplinary Progressing    Description: Patient's Long Term Goal: Return to walking    Interventions:  - Follow MD recommendations  - Work with PT/OT  - See additional Care Plan goals for specific interventions   Patient/Family Short Term Goal     Interdisciplinary Progressing    Description: Patient's Short Term Goal: Pain control     Interventions:   - Follow MD recommendations   - Pharmacologic interventions   - Nonpharmacologic interventions  - See additional Care Plan goals for specific interventions

## (undated) NOTE — LETTER
Patient Name: Ravindra Butler Jr., DOB-Age / Sex: 1955-A: 69 y  male   Medical Records: [id number] CSN: [id number]    ABNORMAL VALUES  Surgeon(s):  Yadira Carbone MD  Anesthesia Type: General  Date of Surgery: 2024  Procedure Description: LEFT LUMBAR 4 - LUMBAR 5 PRONE LATERAL INTERBODY FUSION, POSTERIOR SPINAL FUSION WITH INSTRUMENTATION, LAMINECTOMY, ANTERIOR INSTRUMENTATION  Primary Surgeon:  Yadira Carbone MD  Phone Number: [phone]    PLEASE NOTE THE FOLLOWING ABNORMALITIES:   UA  - elevated protein noted   ________________________________________________________

## (undated) NOTE — LETTER
OUTSIDE TESTING RESULT REQUEST     IMPORTANT: FOR YOUR IMMEDIATE ATTENTION  Please FAX all test results listed below to: [phone]     Testing already done on or about:      * * * * If testing is NOT complete, arrange with patient A.S.A.P. * * * *      Patient Name: Ravindra Butler Jr.  Surgery Date: 2024  Medical Record: [id number]  CSN: [id number]  : 1955 - A: 69 y     Sex: male  Surgeon(s):  Yadira Carbone MD  Procedure: LEFT LUMBAR 4 - LUMBAR 5 PRONE LATERAL INTERBODY FUSION, POSTERIOR SPINAL FUSION WITH INSTRUMENTATION, LAMINECTOMY, ANTERIOR INSTRUMENTATION  Anesthesia Type: General     Surgeon: Yadira Carbone MD     The following Testing and Time Line are REQUIRED PER ANESTHESIA     EKG READ AND SIGNED WITHIN   90 days  CBC [with Differential & Platelets] within  90 days  CMP (requires 4 hour fast) within  90 days  PT/INR within  30 days  PTT within  30 days  UA with Reflex to Culture within  30 days  MSSA/MRSA Nasal screening within 30 days      Thank You,   Sent by: Celia HART